# Patient Record
Sex: MALE | Race: WHITE | Employment: UNEMPLOYED | URBAN - NONMETROPOLITAN AREA
[De-identification: names, ages, dates, MRNs, and addresses within clinical notes are randomized per-mention and may not be internally consistent; named-entity substitution may affect disease eponyms.]

---

## 2021-02-05 ENCOUNTER — HOSPITAL ENCOUNTER (INPATIENT)
Age: 21
LOS: 16 days | Discharge: HOME | DRG: 885 | End: 2021-02-23
Attending: EMERGENCY MEDICINE | Admitting: PSYCHIATRY & NEUROLOGY
Payer: COMMERCIAL

## 2021-02-05 DIAGNOSIS — F20.9 SCHIZOPHRENIA, UNSPECIFIED TYPE (HCC): Primary | ICD-10-CM

## 2021-02-05 PROCEDURE — 99285 EMERGENCY DEPT VISIT HI MDM: CPT

## 2021-02-05 PROCEDURE — 80053 COMPREHEN METABOLIC PANEL: CPT

## 2021-02-05 PROCEDURE — U0003 INFECTIOUS AGENT DETECTION BY NUCLEIC ACID (DNA OR RNA); SEVERE ACUTE RESPIRATORY SYNDROME CORONAVIRUS 2 (SARS-COV-2) (CORONAVIRUS DISEASE [COVID-19]), AMPLIFIED PROBE TECHNIQUE, MAKING USE OF HIGH THROUGHPUT TECHNOLOGIES AS DESCRIBED BY CMS-2020-01-R: HCPCS

## 2021-02-05 PROCEDURE — 81001 URINALYSIS AUTO W/SCOPE: CPT

## 2021-02-05 PROCEDURE — 85025 COMPLETE CBC W/AUTO DIFF WBC: CPT

## 2021-02-05 PROCEDURE — 80307 DRUG TEST PRSMV CHEM ANLYZR: CPT

## 2021-02-05 PROCEDURE — 36415 COLL VENOUS BLD VENIPUNCTURE: CPT

## 2021-02-05 PROCEDURE — 82077 ASSAY SPEC XCP UR&BREATH IA: CPT

## 2021-02-05 RX ORDER — SODIUM CHLORIDE 0.9 % (FLUSH) 0.9 %
5-40 SYRINGE (ML) INJECTION AS NEEDED
Status: DISCONTINUED | OUTPATIENT
Start: 2021-02-05 | End: 2021-02-09

## 2021-02-06 LAB
ALBUMIN SERPL-MCNC: 4.1 G/DL (ref 3.5–5)
ALBUMIN/GLOB SERPL: 1.3 {RATIO} (ref 1.1–2.2)
ALP SERPL-CCNC: 51 U/L (ref 45–117)
ALT SERPL-CCNC: 53 U/L (ref 12–78)
AMPHET UR QL SCN: NEGATIVE
ANION GAP SERPL CALC-SCNC: 10 MMOL/L (ref 5–15)
APPEARANCE UR: CLEAR
AST SERPL W P-5'-P-CCNC: 65 U/L (ref 15–37)
BACTERIA URNS QL MICRO: NEGATIVE /HPF
BARBITURATES UR QL SCN: NEGATIVE
BASOPHILS # BLD: 0 K/UL (ref 0–0.2)
BASOPHILS NFR BLD: 0 % (ref 0–2.5)
BENZODIAZ UR QL: NEGATIVE
BILIRUB SERPL-MCNC: 0.6 MG/DL (ref 0.2–1)
BILIRUB UR QL: NEGATIVE
BUN SERPL-MCNC: 14 MG/DL (ref 6–20)
BUN/CREAT SERPL: 13 (ref 12–20)
CA-I BLD-MCNC: 9 MG/DL (ref 8.5–10.1)
CANNABINOIDS UR QL SCN: POSITIVE
CHLORIDE SERPL-SCNC: 102 MMOL/L (ref 97–108)
CO2 SERPL-SCNC: 27 MMOL/L (ref 21–32)
COCAINE UR QL SCN: NEGATIVE
COLOR UR: ABNORMAL
CREAT SERPL-MCNC: 1.06 MG/DL (ref 0.7–1.3)
DRUG SCRN COMMENT,DRGCM: ABNORMAL
ECSTASY, ECST: NEGATIVE
EOSINOPHIL # BLD: 0 K/UL (ref 0–0.7)
EOSINOPHIL NFR BLD: 0 % (ref 0.9–2.9)
ERYTHROCYTE [DISTWIDTH] IN BLOOD BY AUTOMATED COUNT: 12.9 % (ref 11.5–14.5)
ETHANOL SERPL-MCNC: <4 MG/DL
GLOBULIN SER CALC-MCNC: 3.1 G/DL (ref 2–4)
GLUCOSE SERPL-MCNC: 97 MG/DL (ref 65–100)
GLUCOSE UR STRIP.AUTO-MCNC: NEGATIVE MG/DL
HCT VFR BLD AUTO: 42.5 % (ref 41–53)
HGB BLD-MCNC: 14 G/DL (ref 13.5–17.5)
HGB UR QL STRIP: NEGATIVE
KETONES UR QL STRIP.AUTO: NEGATIVE MG/DL
LEUKOCYTE ESTERASE UR QL STRIP.AUTO: NEGATIVE
LYMPHOCYTES # BLD: 1.2 K/UL (ref 1–4.8)
LYMPHOCYTES NFR BLD: 15 % (ref 20.5–51.1)
MCH RBC QN AUTO: 30.4 PG (ref 31–34)
MCHC RBC AUTO-ENTMCNC: 32.9 G/DL (ref 31–36)
MCV RBC AUTO: 92.5 FL (ref 80–100)
METHADONE UR QL: NEGATIVE
MONOCYTES # BLD: 1 K/UL (ref 0.2–2.4)
MONOCYTES NFR BLD: 12 % (ref 1.7–9.3)
NEUTS SEG # BLD: 5.8 K/UL (ref 1.8–7.7)
NEUTS SEG NFR BLD: 73 % (ref 42–75)
NITRITE UR QL STRIP.AUTO: NEGATIVE
NRBC # BLD: 0.01 K/UL
NRBC BLD-RTO: 0.1 PER 100 WBC
OPIATES UR QL: NEGATIVE
PCP UR QL: NEGATIVE
PH UR STRIP: 6.5 [PH] (ref 5–8)
PLATELET # BLD AUTO: 196 K/UL
PMV BLD AUTO: 8.7 FL (ref 6.5–11.5)
POTASSIUM SERPL-SCNC: 3.7 MMOL/L (ref 3.5–5.1)
PROT SERPL-MCNC: 7.2 G/DL (ref 6.4–8.2)
PROT UR STRIP-MCNC: ABNORMAL MG/DL
RBC # BLD AUTO: 4.6 M/UL (ref 4.5–5.9)
RBC #/AREA URNS HPF: ABNORMAL /HPF (ref 0–3)
SARS-COV-2, COV2: NORMAL
SODIUM SERPL-SCNC: 139 MMOL/L (ref 136–145)
SP GR UR REFRACTOMETRY: 1.01 (ref 1–1.03)
UA: UC IF INDICATED,UAUC: ABNORMAL
UROBILINOGEN UR QL STRIP.AUTO: >8 EU/DL (ref 0.2–1)
WBC # BLD AUTO: 8 K/UL (ref 4.4–11.3)
WBC URNS QL MICRO: ABNORMAL /HPF (ref 0–5)

## 2021-02-06 PROCEDURE — 74011250637 HC RX REV CODE- 250/637: Performed by: EMERGENCY MEDICINE

## 2021-02-06 RX ORDER — LORAZEPAM 2 MG/1
2 TABLET ORAL
Status: COMPLETED | OUTPATIENT
Start: 2021-02-06 | End: 2021-02-06

## 2021-02-06 RX ADMIN — LORAZEPAM 2 MG: 2 TABLET ORAL at 19:46

## 2021-02-06 NOTE — ED NOTES
The patient continues to attempt to roam the perales. He is encouraged to stay in the assigned room.

## 2021-02-06 NOTE — ED NOTES
The patient is undressed and is assisted into a gown. The patient is having delusions. He believes that he is [employer] and continues to have auditory hallucinations as evidenced by him holding conversations with individuals that are not present. The patient is calm at this time. He is denying any SI but is unsure of HI. He is unable to state the last time that he has eaten, consumed fluids, or if he is prescribed any medications. He is provided with a warm blanket, ice water and a meal tray.

## 2021-02-06 NOTE — ED NOTES
Per [place] police they are to busy and out on calls to do Eco at this time. Per [place] police call security if pt attempts to run.

## 2021-02-06 NOTE — ED NOTES
Staff Contacted by [facility] Tarsha. We are to fax documentation and she will contact us via telemedicine to conduct a prescreen interview.

## 2021-02-06 NOTE — ED NOTES
Patient care and report given to [facility], RN. The patient is lying on the left side with his eyes closed. He appears to be resting.

## 2021-02-06 NOTE — ED NOTES
[place] [id number] contacted staff and spoke with Dr. Raiza Orantes.  We are to contact [employer] when the patient wakes up and she will pre screen the patient at that time, as per [employer]. Dr. Raiza Orantes was advised to contact the  [facility] if the patient attempts to leave to file for an ECO.

## 2021-02-06 NOTE — ED NOTES
Spoke with Tarsha ALSTON, will fax a ECO form for writer to fill out and fax to [facility] office at [phone]

## 2021-02-06 NOTE — ED NOTES
Pt escorted back to room 3 by 3 officers from [employer]. Per officers they are unable do a ECO and that writer will have to contact [employer] police.
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
## 2021-02-06 NOTE — ED NOTES
The patient is talking to staff with a sitter 1:1. The patient is roaming out into the halls and is pacing in the room talking to himself. He is encouraged to stay in his room.
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
## 2021-02-06 NOTE — ED PROVIDER NOTES
EMERGENCY DEPARTMENT HISTORY AND PHYSICAL EXAM  ?    Date: 2/5/2021  Patient Name: Berta LiuLeila    History of Presenting Illness    Patient presents with:  Altered mental status      History Provided By: [place] Enforcement    HPI: Aditya Dawson, 23 y.o. male with unknown past medical history  presents to the ED brought by [facility] [employer], found wandering [place] [address] shirtless. Patient is claiming he is \"Shawn Finn.\"  Patient states he may or may not have done some PCP today. The patient is having delusions and auditory hallucinations, evidenced by his holding conversations with individuals that are not present. The patient is calm at this time. He is denying any SI but is unsure of HI. He is unable to state the last time that he has eaten, consumed fluids, or if he is prescribed any medications. He says his home is [address] [place] [address]. He cannot tell me where he was headed when he was found by police. He is disoriented to current location. There are no other complaints, changes, or physical findings at this time. PCP: None    Current Facility-Administered Medications:    sodium chloride (NS) flush 5-40 mL, 5-40 mL, IntraVENous, PRN, Lili Mcclain MD        Past History    Past Medical History:  History reviewed. No pertinent past medical history. Past Surgical History:  History reviewed. No pertinent surgical history. Family History:  History reviewed. No pertinent family history. Social History:  Social History   Tobacco Use     Smoking status: Current Every Day Smoker       Packs/day: 1.00     Smokeless tobacco: Never Used   Alcohol use: Not Currently   Drug use: Yes      Allergies:  No Known Allergies      Review of Systems  @[facility]@    Physical Exam  @[facility]    Diagnostic Study Results    Labs -  No results found for this or any previous visit (from the past 12 hour(s)).     Radiologic Studies -   No orders to display  CT Results  (Last 48 hours)   None     CXR Results  (Last 48 hours)   None       Medical Decision Making and ED Course  I am the first provider for this patient. I reviewed the vital signs, available nursing notes, past medical history, past surgical history, family history and social history. Vital Signs-Reviewed the patient's vital signs. Empty flowsheet group. Records Reviewed: Nursing Notes    Provider Notes (Medical Decision Making):   Patient presents with delusions and hallucinations. Unknown if this is new or recurrent problem. Will provide medical screening. The patient presents with differential diagnosis of acute psychosis, drug use, delirium. ED Course:     Initial assessment performed. The patients presenting problems have been discussed, and they are in agreement with the care plan formulated and outlined with them. I have encouraged them to ask questions as they arise throughout their visit. CRITICAL CARE NOTE :  8:59 PM  Amount of Critical Care Time: ___30_(minutes)__    IMPENDING DETERIORATION -CNS  ASSOCIATED RISK FACTORS - CNS Decompensation  MANAGEMENT- Bedside Assessment and Supervision of Care  INTERVENTIONS -psychiatric  CASE REVIEW -[place] [id number]  TREATMENT RESPONSE -Unchanged   PERFORMED BY - Physician    NOTES   :  I have spent critical care time involved in lab review, consultations with specialist, family decision- making, bedside attention and documentation. This time excludes time spent in any separate billed procedures. During this entire length of time I was immediately available to the patient . Belem Greer MD        Disposition    ECO, transfer pending        Diagnosis    Clinical Impression: Schizophrenia    Belem Greer MD    Please note that this dictation was completed with [employer], the computer voice recognition software. Quite often unanticipated grammatical, syntax, homophones, and other interpretive errors are inadvertently transcribed by the computer software.   Please disregard these errors. Please excuse any errors that have escaped final proofreading. Thank you.    ? History reviewed. No pertinent past medical history. History reviewed. No pertinent surgical history. History reviewed. No pertinent family history. Social History     Socioeconomic History    Marital status: UNKNOWN     Spouse name: Not on file    Number of children: Not on file    Years of education: Not on file    Highest education level: Not on file   Occupational History    Not on file   Social Needs    Financial resource strain: Not on file    Food insecurity     Worry: Not on file     Inability: Not on file    Transportation needs     Medical: Not on file     Non-medical: Not on file   Tobacco Use    Smoking status: Current Every Day Smoker     Packs/day: 1.00    Smokeless tobacco: Never Used   Substance and Sexual Activity    Alcohol use: Not Currently    Drug use: Yes    Sexual activity: Yes     Partners: Female     Birth control/protection: None   Lifestyle    Physical activity     Days per week: Not on file     Minutes per session: Not on file    Stress: Not on file   Relationships    Social connections     Talks on phone: Not on file     Gets together: Not on file     Attends Zoroastrianism service: Not on file     Active member of club or organization: Not on file     Attends meetings of clubs or organizations: Not on file     Relationship status: Not on file    Intimate partner violence     Fear of current or ex partner: Not on file     Emotionally abused: Not on file     Physically abused: Not on file     Forced sexual activity: Not on file   Other Topics Concern    Not on file   Social History Narrative    Not on file         ALLERGIES: Patient has no known allergies. Review of Systems   Constitutional: Negative. HENT: Negative. Eyes: Negative. Respiratory: Negative. Cardiovascular: Negative. Gastrointestinal: Negative. Endocrine: Negative. Genitourinary: Negative. Musculoskeletal: Negative. Skin: Negative. Allergic/Immunologic: Negative. Neurological: Negative. Hematological: Negative. Psychiatric/Behavioral: Positive for confusion and hallucinations. Negative for suicidal ideas. The patient is nervous/anxious. There were no vitals filed for this visit. Physical Exam  Vitals signs and nursing note reviewed. Constitutional:       Appearance: Normal appearance. He is well-developed and normal weight. HENT:      Head: Normocephalic and atraumatic. Right Ear: Tympanic membrane, ear canal and external ear normal.      Left Ear: Tympanic membrane, ear canal and external ear normal.      Nose: Nose normal.      Mouth/Throat:      Mouth: Mucous membranes are moist.      Pharynx: Oropharynx is clear. Eyes:      Extraocular Movements: Extraocular movements intact. Conjunctiva/sclera: Conjunctivae normal.      Pupils: Pupils are equal, round, and reactive to light. Neck:      Musculoskeletal: Normal range of motion and neck supple. Cardiovascular:      Rate and Rhythm: Normal rate and regular rhythm. Pulses: Normal pulses. Heart sounds: Normal heart sounds. Pulmonary:      Effort: Pulmonary effort is normal.      Breath sounds: Normal breath sounds. Abdominal:      General: Abdomen is flat. Bowel sounds are normal.      Palpations: Abdomen is soft. Musculoskeletal: Normal range of motion. Skin:     General: Skin is warm and dry. Neurological:      General: No focal deficit present. Mental Status: He is alert. He is disoriented and confused. Psychiatric:         Behavior: Behavior is agitated. Cognition and Memory: Memory is impaired.           MDM  Number of Diagnoses or Management Options     Amount and/or Complexity of Data Reviewed  Clinical lab tests: reviewed    Risk of Complications, Morbidity, and/or Mortality  Presenting problems: high  Diagnostic procedures: moderate  Management options: moderate      ED Course as of Feb 07 0145   Sat Feb 06, 2021   8842 I discussed case with [place] [id number]. They will assess this morning. Patient is medically cleared. [RA]   2015 Patient care signed out at shift change. Patient is under ECO. He eloped twice today. Awaiting Covid tests result for placement.     [RA]      ED Course User Index  [RA] Vianney Simmons MD       Procedures

## 2021-02-06 NOTE — ED NOTES
Prescreen completed. Bob Willingham pt does not have the capacity to make decisions. Bob Willingham Pt needs Eco. Fax info for medical clearance to [phone] once covid results. Attempted to contact [facility] office twice with no answer.  Left  with call back number

## 2021-02-06 NOTE — ED NOTES
The patient has remembered his father's phone number. Same has been added into his demographics. He has given staff verbal permission to contact his father and discuss his medical situation. His father's name is Richelle Rincon and he can be reached at [phone].

## 2021-02-06 NOTE — ED NOTES
T/C to [employer] and was transferred to [employer] who states he was unable to help due to only officers being able to a paperless ECO and was given a phone number for [facility]. Contacted [facility] x3 times and left VMs.

## 2021-02-06 NOTE — ED TRIAGE NOTES
Per EMS, they were called by Rafael Currie for patient who was found wandering [place] [address] [place]. Patient presents on stretcher claiming he is \"Shawn Briseno.\"  Patient states he may or may not have done some PCP today.

## 2021-02-06 NOTE — ED NOTES
Jessica Coto, staff , is assigned as a 1:1 sitter with the patient due to delusions and auditory hallucinations.
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
## 2021-02-06 NOTE — ED NOTES
Call to [employer] to f/u on no reply from  office Per Chencho Greene to attempt [facility] [phone].  Called with no reply left VM

## 2021-02-06 NOTE — ED NOTES
Father contacted, per pt's request, received callback and was given the pt's Mother's phone number for more specific information regarding healthcare: Chris Olguin [phone].

## 2021-02-06 NOTE — ED NOTES
The patient is easily awakened to obtain a COVID 19 test and assess the vital signs. He is calm at this time.

## 2021-02-06 NOTE — ED NOTES
Call to lab to verify if covid test has been sent out. Miriam huynh she just came in and does not know if it has because she was not there.

## 2021-02-06 NOTE — ED NOTES
The patient is now lying on his bed with his eyes closed and he appears to be resting. He is wrapped in a warm blanket and is lying on his left side with his knees drawn up.   NAD noted

## 2021-02-06 NOTE — ED NOTES
The patient's father did not answer the phone. A voice message was left asking him to contact staff.
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
## 2021-02-06 NOTE — ED NOTES
Unable to get skype to connect to email given by Tarsha at [employer].  Attempted to contact [employer] again with no response

## 2021-02-06 NOTE — ED NOTES
The patient is lying supine on the bed with his eyes closed and he appears to be resting. He is moving his foot in his sleep.

## 2021-02-06 NOTE — ED NOTES
Call to lab to see when test will be picked up.  Miriam states pts test is not in their inventory but the test was logged at [facility]

## 2021-02-07 PROBLEM — F20.9 SCHIZOPHRENIA (HCC): Status: ACTIVE | Noted: 2021-02-07

## 2021-02-07 PROBLEM — F31.9 BIPOLAR 1 DISORDER (HCC): Status: ACTIVE | Noted: 2021-02-07

## 2021-02-07 LAB
SARS-COV-2, COV2: NOT DETECTED
SPECIMEN SOURCE: NORMAL

## 2021-02-07 PROCEDURE — 74011250637 HC RX REV CODE- 250/637: Performed by: EMERGENCY MEDICINE

## 2021-02-07 PROCEDURE — 74011250637 HC RX REV CODE- 250/637: Performed by: PSYCHIATRY & NEUROLOGY

## 2021-02-07 PROCEDURE — 74011000250 HC RX REV CODE- 250: Performed by: EMERGENCY MEDICINE

## 2021-02-07 PROCEDURE — 74011000250 HC RX REV CODE- 250

## 2021-02-07 PROCEDURE — 65220000003 HC RM SEMIPRIVATE PSYCH

## 2021-02-07 RX ORDER — BACITRACIN 500 UNIT/G
1 PACKET (EA) TOPICAL
Status: COMPLETED | OUTPATIENT
Start: 2021-02-07 | End: 2021-02-07

## 2021-02-07 RX ORDER — OLANZAPINE 2.5 MG/1
5 TABLET ORAL
Status: DISCONTINUED | OUTPATIENT
Start: 2021-02-07 | End: 2021-02-09

## 2021-02-07 RX ORDER — HALOPERIDOL 5 MG/ML
5 INJECTION INTRAMUSCULAR
Status: DISCONTINUED | OUTPATIENT
Start: 2021-02-07 | End: 2021-02-09

## 2021-02-07 RX ORDER — BENZTROPINE MESYLATE 1 MG/1
1 TABLET ORAL
Status: DISCONTINUED | OUTPATIENT
Start: 2021-02-07 | End: 2021-02-09

## 2021-02-07 RX ORDER — LORAZEPAM 1 MG/1
1 TABLET ORAL
Status: COMPLETED | OUTPATIENT
Start: 2021-02-07 | End: 2021-02-07

## 2021-02-07 RX ORDER — OLANZAPINE 15 MG/1
15 TABLET, ORALLY DISINTEGRATING ORAL 2 TIMES DAILY
Status: DISCONTINUED | OUTPATIENT
Start: 2021-02-07 | End: 2021-02-23 | Stop reason: HOSPADM

## 2021-02-07 RX ORDER — DIPHENHYDRAMINE HYDROCHLORIDE 50 MG/ML
50 INJECTION, SOLUTION INTRAMUSCULAR; INTRAVENOUS
Status: DISCONTINUED | OUTPATIENT
Start: 2021-02-07 | End: 2021-02-09

## 2021-02-07 RX ORDER — ACETAMINOPHEN 325 MG/1
650 TABLET ORAL
Status: DISCONTINUED | OUTPATIENT
Start: 2021-02-07 | End: 2021-02-23 | Stop reason: HOSPADM

## 2021-02-07 RX ORDER — BACITRACIN 500 UNIT/G
PACKET (EA) TOPICAL
Status: COMPLETED
Start: 2021-02-07 | End: 2021-02-07

## 2021-02-07 RX ORDER — TRAZODONE HYDROCHLORIDE 50 MG/1
50 TABLET ORAL
Status: DISCONTINUED | OUTPATIENT
Start: 2021-02-07 | End: 2021-02-23 | Stop reason: HOSPADM

## 2021-02-07 RX ORDER — LORAZEPAM 2 MG/ML
1 INJECTION INTRAMUSCULAR
Status: DISCONTINUED | OUTPATIENT
Start: 2021-02-07 | End: 2021-02-09

## 2021-02-07 RX ORDER — IBUPROFEN 200 MG
1 TABLET ORAL DAILY
Status: DISCONTINUED | OUTPATIENT
Start: 2021-02-08 | End: 2021-02-23 | Stop reason: HOSPADM

## 2021-02-07 RX ORDER — HYDROXYZINE 25 MG/1
50 TABLET, FILM COATED ORAL
Status: DISCONTINUED | OUTPATIENT
Start: 2021-02-07 | End: 2021-02-23 | Stop reason: HOSPADM

## 2021-02-07 RX ADMIN — OLANZAPINE 15 MG: 15 TABLET, ORALLY DISINTEGRATING ORAL at 16:16

## 2021-02-07 RX ADMIN — OLANZAPINE 15 MG: 15 TABLET, ORALLY DISINTEGRATING ORAL at 20:38

## 2021-02-07 RX ADMIN — BACITRACIN 1 PACKET: 500 OINTMENT TOPICAL at 16:17

## 2021-02-07 RX ADMIN — BACITRACIN 1 PACKET: 500 OINTMENT TOPICAL at 08:34

## 2021-02-07 RX ADMIN — LORAZEPAM 1 MG: 1 TABLET ORAL at 11:38

## 2021-02-07 RX ADMIN — OLANZAPINE 5 MG: 2.5 TABLET, FILM COATED ORAL at 13:01

## 2021-02-07 RX ADMIN — TRAZODONE HYDROCHLORIDE 50 MG: 50 TABLET ORAL at 20:38

## 2021-02-07 RX ADMIN — HYDROXYZINE HYDROCHLORIDE 50 MG: 25 TABLET, FILM COATED ORAL at 22:56

## 2021-02-07 NOTE — ED NOTES
Pt up pacing in room. Dressing to left AC placed and bacitracin applied as ordered. [place] Officer at bedside.

## 2021-02-07 NOTE — ED NOTES
U called for update and they are awaiting a patient from outside facility in about 15 minutes. Nurse to call back.

## 2021-02-07 NOTE — ED NOTES
Pt standing outside of room, requesting ativan to sleep. MD aware and stated that he put an order in for it earlier. Pt medicated per MAR, tolerated well.
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
## 2021-02-07 NOTE — ED NOTES
Spoke to Gris Soto in [facility] and pt can be brought back to [address] [place] [address].  Security ntfd

## 2021-02-08 LAB
CHOLEST SERPL-MCNC: 87 MG/DL
EST. AVERAGE GLUCOSE BLD GHB EST-MCNC: 103 MG/DL
HBA1C MFR BLD: 5.2 % (ref 4–5.6)
HDLC SERPL-MCNC: 39 MG/DL
HDLC SERPL: 2.2 {RATIO} (ref 0–5)
LDLC SERPL CALC-MCNC: 39.6 MG/DL (ref 0–100)
LIPID PROFILE,FLP: NORMAL
TRIGL SERPL-MCNC: 42 MG/DL (ref ?–150)
TSH SERPL DL<=0.05 MIU/L-ACNC: 1.43 UIU/ML (ref 0.36–3.74)
VLDLC SERPL CALC-MCNC: 8.4 MG/DL

## 2021-02-08 PROCEDURE — 80061 LIPID PANEL: CPT

## 2021-02-08 PROCEDURE — 74011250637 HC RX REV CODE- 250/637: Performed by: PSYCHIATRY & NEUROLOGY

## 2021-02-08 PROCEDURE — 36415 COLL VENOUS BLD VENIPUNCTURE: CPT

## 2021-02-08 PROCEDURE — 84443 ASSAY THYROID STIM HORMONE: CPT

## 2021-02-08 PROCEDURE — 65220000003 HC RM SEMIPRIVATE PSYCH

## 2021-02-08 PROCEDURE — 83036 HEMOGLOBIN GLYCOSYLATED A1C: CPT

## 2021-02-08 RX ORDER — LORAZEPAM 0.5 MG/1
0.5 TABLET ORAL 3 TIMES DAILY
Status: DISCONTINUED | OUTPATIENT
Start: 2021-02-08 | End: 2021-02-15

## 2021-02-08 RX ORDER — OLANZAPINE 10 MG/1
10 TABLET, ORALLY DISINTEGRATING ORAL
Status: COMPLETED | OUTPATIENT
Start: 2021-02-08 | End: 2021-02-08

## 2021-02-08 RX ADMIN — OLANZAPINE 15 MG: 15 TABLET, ORALLY DISINTEGRATING ORAL at 08:31

## 2021-02-08 RX ADMIN — BENZTROPINE MESYLATE 1 MG: 1 TABLET ORAL at 22:27

## 2021-02-08 RX ADMIN — OLANZAPINE 5 MG: 2.5 TABLET, FILM COATED ORAL at 22:27

## 2021-02-08 RX ADMIN — LORAZEPAM 0.5 MG: 0.5 TABLET ORAL at 13:53

## 2021-02-08 RX ADMIN — HYDROXYZINE HYDROCHLORIDE 50 MG: 25 TABLET, FILM COATED ORAL at 17:56

## 2021-02-08 RX ADMIN — HYDROXYZINE HYDROCHLORIDE 50 MG: 25 TABLET, FILM COATED ORAL at 20:53

## 2021-02-08 RX ADMIN — HYDROXYZINE HYDROCHLORIDE 50 MG: 25 TABLET, FILM COATED ORAL at 08:31

## 2021-02-08 RX ADMIN — OLANZAPINE 15 MG: 15 TABLET, ORALLY DISINTEGRATING ORAL at 20:53

## 2021-02-08 RX ADMIN — LORAZEPAM 0.5 MG: 0.5 TABLET ORAL at 20:53

## 2021-02-08 RX ADMIN — OLANZAPINE 10 MG: 10 TABLET, ORALLY DISINTEGRATING ORAL at 13:53

## 2021-02-08 NOTE — PROGRESS NOTES
Problem: Falls - Risk of  Goal: *Absence of Falls  Description: Document [facility] Fall Risk and appropriate interventions in the flowsheet. Outcome: Progressing Towards Goal  Note: Fall Risk Interventions:       Mentation Interventions: Room close to nurse's station    Medication Interventions: Teach patient to arise slowly    Elimination Interventions:  Toilet paper/wipes in reach    History of Falls Interventions: Room close to nurse's station         Problem: Patient Education: Go to Patient Education Activity  Goal: Patient/Family Education  Outcome: Progressing Towards Goal     Problem: Psychosis  Goal: *STG: Decreased hallucinations  Outcome: Progressing Towards Goal  Goal: *STG: Decreased delusional thinking  Outcome: Progressing Towards Goal  Goal: *STG: Remains safe in hospital  Outcome: Progressing Towards Goal  Goal: *STG: Seeks staff when feelings of self harm or harm towards others arise  Outcome: Progressing Towards Goal  Goal: *STG: Patient will verbalize areas in need of boundary recognition and limit setting  Outcome: Progressing Towards Goal  Goal: *STG: Patient will develop strategies to regulate emotions and corresponding behaviors  Outcome: Progressing Towards Goal  Goal: *STG: Accept constructive criticism without injury or isolation  Outcome: Progressing Towards Goal  Goal: *STG: Participates in individual and group therapy  Outcome: Progressing Towards Goal  Goal: *STG: Develop safety plan for times when triggered in stressful situations  Outcome: Progressing Towards Goal  Goal: *STG: Patient will verbalize issues that get in their way of progress (i.e.: anger, fear etc.)  Outcome: Progressing Towards Goal  Goal: *STG/LTG: Complies with medication therapy  Outcome: Progressing Towards Goal  Goal: *STG/LTG: Demonstrates improved thought patterns as evidenced by logical and coherent speech  Outcome: Progressing Towards Goal  Goal: *STG/LTG: Demonstrates improved social functioning by responding appropriately to staff  Outcome: Progressing Towards Goal  Goal: Interventions  Outcome: Progressing Towards Goal     Problem: *Psychosis: Discharge Outcome  Goal: *Absent or Controlled Active Psychotic Symptoms  Outcome: Progressing Towards Goal     Problem: Patient Education: Go to Patient Education Activity  Goal: Patient/Family Education  Outcome: Progressing Towards Goal

## 2021-02-08 NOTE — PROGRESS NOTES
Problem: Falls - Risk of  Goal: *Absence of Falls  Description: Document [place] [facility] Fall Risk and appropriate interventions in the flowsheet. Outcome: Progressing Towards Goal  Note: Fall Risk Interventions:       Mentation Interventions: Room close to nurse's station, Door open when patient unattended    Medication Interventions: Teach patient to arise slowly    Elimination Interventions:  Toilet paper/wipes in reach    History of Falls Interventions: Door open when patient unattended, Room close to nurse's station

## 2021-02-08 NOTE — BH NOTES
Pt slept overnight with waking intervals to use bathrooma nd drink water, sleeping at this time, no violent/self harming behaviors noticed or reported, safety checks maintained Q 15 minutes.

## 2021-02-08 NOTE — BH NOTES
Pt recived beging of shift in bed sleeping, got up by 2010 and joined group and ate snack, pt rated depression as 5, anxiety 5, denies SI.HI . Cooperative with assessment, his Left arm  AC area abrasion( not new finding) without dressing, he said he scratch it this is how he got the abrasion, he removed the dya shift dressing, big band aid applied. Pt given at  2038 HS medication and Trazodone 50 mg prn for insomnia, he came to window at 2256 telling unable to sleep, given Atarax 50mg for anxiety. PT sleeping at this time, no violent ,no self harming behavior noticed or reported.

## 2021-02-08 NOTE — BH NOTES
camila sent to [facility] given to security [place]( total fierro amount of $ 102) + wallet with 6 cards, and one ID card.
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
## 2021-02-08 NOTE — BH NOTES
Pt has been visible through out the day. Pt is attending groups, and is not interacting appropriately with staff and peers. Pt has been observed walking the hallway at different intervals throughout the day, stopping and staring into the nurses station, however doesn't appear to be focusing on anyone or thing in particular. Pt has spoken with both his mother and father separately today, and appeared to be upset, saddened by the phone calls. no s/s of distress noted. no complaints of pain. Pt mood has been calm, stable. Continue safety checks.

## 2021-02-08 NOTE — GROUP NOTE
IP  GROUP DOCUMENTATION INDIVIDUAL Group Therapy Note Date: 2/8/2021 Group Start Time: 0830 Group End Time: 0900 Group Topic: Process Group - [facility]  [facility] 
 
IP  GROUP DOCUMENTATION GROUP Group Therapy Note SW led process group for daily check-in. Group members were asked their current feelings, triggers, and goals. Group members were encouraged to share openly. Attendance: Attended Patient's Goal:  Attendance Interventions/techniques: Informed, Validated and Supported Follows Directions: Followed directions Interactions: Interacted appropriately Mental Status: Delusions and Flat Behavior/appearance: Attentive, Cooperative and Disheveled Goals Achieved: Able to listen to others Additional Notes:  Sandra Boggs was minimally verbal this morning. He reports he is unclear about why he is here. He denies depression and anxiety at this time. TAYLOR Gibson, Supervisee in Social Work
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
## 2021-02-09 PROCEDURE — 74011000250 HC RX REV CODE- 250: Performed by: NURSE PRACTITIONER

## 2021-02-09 PROCEDURE — 65220000003 HC RM SEMIPRIVATE PSYCH

## 2021-02-09 PROCEDURE — 74011250637 HC RX REV CODE- 250/637: Performed by: PSYCHIATRY & NEUROLOGY

## 2021-02-09 RX ORDER — HALOPERIDOL 5 MG/1
5 TABLET ORAL 2 TIMES DAILY
Status: DISCONTINUED | OUTPATIENT
Start: 2021-02-09 | End: 2021-02-23 | Stop reason: HOSPADM

## 2021-02-09 RX ADMIN — TRAZODONE HYDROCHLORIDE 50 MG: 50 TABLET ORAL at 22:10

## 2021-02-09 RX ADMIN — LORAZEPAM 0.5 MG: 0.5 TABLET ORAL at 22:09

## 2021-02-09 RX ADMIN — LORAZEPAM 0.5 MG: 0.5 TABLET ORAL at 08:18

## 2021-02-09 RX ADMIN — BACITRACIN ZINC, POLYMYXIN B SULFATE, NEOMYCIN SULFATE 1 PACKET: 400; 5000; 3.5 OINTMENT TOPICAL at 17:02

## 2021-02-09 RX ADMIN — BACITRACIN ZINC, POLYMYXIN B SULFATE, NEOMYCIN SULFATE 1 PACKET: 400; 5000; 3.5 OINTMENT TOPICAL at 22:10

## 2021-02-09 RX ADMIN — OLANZAPINE 15 MG: 15 TABLET, ORALLY DISINTEGRATING ORAL at 08:18

## 2021-02-09 RX ADMIN — OLANZAPINE 15 MG: 15 TABLET, ORALLY DISINTEGRATING ORAL at 22:09

## 2021-02-09 RX ADMIN — HALOPERIDOL 5 MG: 5 TABLET ORAL at 22:09

## 2021-02-09 RX ADMIN — LORAZEPAM 0.5 MG: 0.5 TABLET ORAL at 13:04

## 2021-02-09 RX ADMIN — HALOPERIDOL 5 MG: 5 TABLET ORAL at 11:57

## 2021-02-09 RX ADMIN — BACITRACIN ZINC, POLYMYXIN B SULFATE, NEOMYCIN SULFATE 1 PACKET: 400; 5000; 3.5 OINTMENT TOPICAL at 08:18

## 2021-02-09 RX ADMIN — HYDROXYZINE HYDROCHLORIDE 50 MG: 25 TABLET, FILM COATED ORAL at 22:09
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
## 2021-02-09 NOTE — BH NOTES
Pt. Alert and oriented x 2. Pt. States depression is a 5. Pt. States anxiety is 2. Pt. Denies hallucinations, however observed responding to internal stimuli. Denies SI/HI. Cooperative with assessment. Pt. Walking back forth in front of nurses station window, staring in  Each time, stating he does not need anything, redirected patient to group room. Pt. Speaking about \"equations\" things just not right. Repeatedly asking for medications, right after taking them. I: Administer medications as ordered and needed, Encourage pt to attend and participate in groups, encourage pt to be up for all meals and snacks, consuming all each time, encourage pt to interact with peers in a positive manner. Q 15 minute safety checks continue. R: Compliant with medications. does not attend groups, does not participate. Pt. is getting up for meals, consumes all of meals, snacks. Pt. does not interact with peers. no safety concerns at this time. P: Pt. Will develop positive coping skills and utilize them, decrease number of delusional episodes.

## 2021-02-09 NOTE — GROUP NOTE
[facility] GROUP DOCUMENTATION INDIVIDUAL Group Therapy Note Date: 2/9/2021 Group Start Time: 1400 Group End Time: 1500 Group Topic: Education Group - Inpatient [facility] Arlene CAN 
 
[facility] GROUP DOCUMENTATION GROUP Group Therapy Note FLY facilitated group and provided psychoeducation to group members. Topic was grounding skills. Experiences of emotionality, detachment, and dissociation were discussed. Grounding was defined and examples were given. Group members identified experiences of detachment, triggers, and things that they find grounding. Grounding techniques were discussed and handout was given. Demonstrated 5-4-3-2-1 technique and deep breathing. Attendance: Attended Patient's Goal:  Attendance Interventions/techniques: Informed, Validated and Supported Follows Directions: Followed directions Interactions: Interacted appropriately Mental Status: Calm and Delusions Behavior/appearance: Attentive and Cooperative Goals Achieved: Able to engage in interactions, Able to listen to others, Able to reflect/comment on own behavior, Able to receive feedback, Able to self-disclose, Identified feelings and Identified triggers Additional Notes:  Pau Victor participated in group process. At times he was able to stay on topic and seemed rational.   
 
Margareth Ahmadi MSW, Supervisee in Social Work

## 2021-02-09 NOTE — GROUP NOTE
[facility] GROUP DOCUMENTATION INDIVIDUAL Group Therapy Note Date: 2/8/2021 Group Start Time: 1400 Group End Time: 1430 Group Topic: Education Group - Inpatient [facility]IORAL [facility] Francie [place] PAMELLA 
 
[facility] GROUP DOCUMENTATION GROUP Group Therapy Note Group members received psychoeducation on relapse prevention. Past relapse were discussed as well as triggers and support systems. Group members were given the opportunity to see how they can take different steps to keep from rehospitalization. Attendance: Attended Patient's Goal:  Attendance Interventions/techniques: Informed, Validated and Supported Follows Directions: Followed directions Interactions: Interacted appropriately Mental Status: Calm Behavior/appearance: Cooperative Goals Achieved: Able to listen to others, Able to reflect/comment on own behavior and Identified feelings Additional Notes:  Felipe Graham presents as confused, but does interact. He was unable to recall any past relapses in depth. He reports that sometimes women led him to relapse. Creta Canavan, MSW, Supervisee in Social Work

## 2021-02-09 NOTE — BH NOTES
B:Patient is alert and oriented x 3. Patient has been pacing most of the first part of the evening. Patient is not able to stay still for any period of time. Patient continues to ask for medications to help him sleep. Patient walks around like he is on a daze. Patient requires redirection. Patient has shown no episodes of aggressive behaviors noted. Will continue to monitor patient every 15 minutes for safety. I: Provide patient with medications as ordered. Continue to monitor every 15 minutes for safety. R: Patient has been complaint with his medications. Patient finally after 2 ours of pacing has settled in other bed in his room with eyes closed. Patient continues to be monitored evrey 15 minutes foe safety. P: Patient has shown no evidence of self harming behaviors. No coping skills noted thus far.

## 2021-02-09 NOTE — BH NOTES
Pt. Confused some today, observed responding to internal stimuli, delusional statements at times regarding math equations. Paranoid statements, such as, \" I will delete my facebook account now, give me a computer and I will get rid of it\", pt. Reoriented to situation, that he cannot have electronics here. Pt. Came up to nurses station window stating, \" I know what is going on around here, you guys saying I don't know and about court is not right, I do know\", as patient overheard phone conversation at nurses station regarding another patient. Pt. Reoriented several times throughout the day. Pacing in front of nurses station most of morning, up and down hallway, then to room, then back to day room. Soft spoken, walks with head down at times. No safety concerns noted. q 15 minute safety checks continue.

## 2021-02-09 NOTE — BH NOTES
Behavioral Health Treatment Team Note     Patient goal(s) for today: Take my medicine  Treatment team focus/goals: Engage in treatment, contact family, monitor medication    Progress note: Upon interview Liz Cedeno is AO x 3, denies HI/SI, with evidence of delusions. He rates both anxiety and depression as 0 of 10. Liz Cedeno is more verbal, slow rate of speech, illogical in thinking, and poor historian. Concern about his ability to care for himself is still present. LOS:  2  Expected LOS: 14    Insurance info/prescription coverage:  TDO, will check if he has Medicaid. Date of last family contact:  2/9/2021  Family requesting physician contact today:  no  Discharge plan:  Discharge to family or possible crisis stabilization.   Guns in the home:  no   Outpatient provider(s):  None at this time    Participating treatment team members: Dirk Parish, Supervisee in Social Work, [employer] LPN, Danita Cason MD

## 2021-02-10 PROCEDURE — 74011250637 HC RX REV CODE- 250/637: Performed by: PSYCHIATRY & NEUROLOGY

## 2021-02-10 PROCEDURE — 74011000250 HC RX REV CODE- 250

## 2021-02-10 PROCEDURE — 74011000250 HC RX REV CODE- 250: Performed by: PSYCHIATRY & NEUROLOGY

## 2021-02-10 PROCEDURE — 74011000250 HC RX REV CODE- 250: Performed by: NURSE PRACTITIONER

## 2021-02-10 PROCEDURE — 65220000003 HC RM SEMIPRIVATE PSYCH

## 2021-02-10 RX ORDER — BACITRACIN 500 UNIT/G
1 PACKET (EA) TOPICAL
Status: COMPLETED | OUTPATIENT
Start: 2021-02-10 | End: 2021-02-10

## 2021-02-10 RX ORDER — BACITRACIN 500 UNIT/G
PACKET (EA) TOPICAL
Status: COMPLETED
Start: 2021-02-10 | End: 2021-02-10

## 2021-02-10 RX ADMIN — LORAZEPAM 0.5 MG: 0.5 TABLET ORAL at 09:04

## 2021-02-10 RX ADMIN — HYDROXYZINE HYDROCHLORIDE 50 MG: 25 TABLET, FILM COATED ORAL at 12:09

## 2021-02-10 RX ADMIN — HALOPERIDOL 5 MG: 5 TABLET ORAL at 09:05

## 2021-02-10 RX ADMIN — BACITRACIN 1 PACKET: 500 OINTMENT TOPICAL at 18:31

## 2021-02-10 RX ADMIN — OLANZAPINE 15 MG: 15 TABLET, ORALLY DISINTEGRATING ORAL at 09:04

## 2021-02-10 RX ADMIN — BACITRACIN 1 PACKET: 500 OINTMENT TOPICAL at 14:00

## 2021-02-10 RX ADMIN — LORAZEPAM 0.5 MG: 0.5 TABLET ORAL at 22:06

## 2021-02-10 RX ADMIN — TRAZODONE HYDROCHLORIDE 50 MG: 50 TABLET ORAL at 22:06

## 2021-02-10 RX ADMIN — OLANZAPINE 15 MG: 15 TABLET, ORALLY DISINTEGRATING ORAL at 22:06

## 2021-02-10 RX ADMIN — BACITRACIN ZINC, POLYMYXIN B SULFATE, NEOMYCIN SULFATE 1 PACKET: 400; 5000; 3.5 OINTMENT TOPICAL at 09:05

## 2021-02-10 RX ADMIN — LORAZEPAM 0.5 MG: 0.5 TABLET ORAL at 13:29

## 2021-02-10 RX ADMIN — HYDROXYZINE HYDROCHLORIDE 50 MG: 25 TABLET, FILM COATED ORAL at 22:06

## 2021-02-10 RX ADMIN — ACETAMINOPHEN 650 MG: 325 TABLET ORAL at 09:56

## 2021-02-10 RX ADMIN — HALOPERIDOL 5 MG: 5 TABLET ORAL at 22:06
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
## 2021-02-10 NOTE — BH NOTES
Pt. Alert and oriented x 3. Pt. States depression is a 3. Pt. States anxiety is 5. Pt. denies hallucinations. Denies SI/HI. No further complaint with assessment. I: Administer medications as ordered and needed, Encourage pt to attend and participate in groups, encourage pt to be up for all meals and snacks, consuming all each time, encourage pt to interact with peers in a positive manner. Q 15 minute safety checks continue. R: Compliant with medications. does attend groups, does participate. Pt. is getting up for meals, consumes 100% of meals, snacks. Pt. does interact with peers. no safety concerns at this time. P: Patient will continue to comply with treatments including medication and therapies. Patient will continue to notify staff of negative thought processes.

## 2021-02-10 NOTE — GROUP NOTE
[facility] GROUP DOCUMENTATION INDIVIDUAL Group Therapy Note Date: 2/10/2021 Group Start Time: 0830 Group End Time: 0900 Group Topic: Process Group - Inpatient [facility] BE[facility]IORAL HE[facility] Richy CAN 
 
[facility] GROUP DOCUMENTATION GROUP Group Therapy Note 
 
(5 of 6 attended) Group members took part in process group. Current depression and anxiety as well as daily goals were discussed. Group members also noted their feelings about being in the facility and their plans for self-care after getting out. Attendance: Attended Patient's Goal:  Attendance Interventions/techniques: Informed, Validated, Reinforced and Supported Follows Directions: Followed directions Interactions: Interacted appropriately Mental Status: Calm and Restricted Behavior/appearance: Attentive, Cooperative and Poor eye contact Goals Achieved: Able to listen to others, Able to give feedback to another, Able to receive feedback, Able to self-disclose, Discussed coping and Discussed discharge plans Additional Notes:  [facility] is AO x 3, without SI/HI, denies A/V hallucinations today. [facility] is disheveled with a somewhat restricted affect. He continues to endorse belief that his 5year old brother is evil. He expresses anger at his mother and states, \"If I saw her I would beat the mess out of her. \"  He is verbal with slow speech, but can go off-topic and speak randomly when others are speaking during group interactions. Safety concerns are present for his ability to self-care and safety of others especially family at this time. TAYLOR Cannon, Supervisee in Social Work

## 2021-02-10 NOTE — BH NOTES
B: Patient is alert and has been to the desk several times but is easily redirected, Patient up for wrap up session but cannot complete worksheet. Patient ate well for snack. Patient showers daily and is dressed in hospital approved scrubs. Patient has a blank look on his face when you try to engage him in conversation. Patient Is being monitored every 15 minutes for safety. Patient has a difficult time getting to sleep Once he is sleep he stays sleep. Patient shakes his head no when asked about Depression, anxiety, SI/HI ideations and hallucinations. Will continue to monitor patient every 15 minutes for safety. I: Provide medications as ordered. Assess for depression every shift. R: Patient is compliant with all medications. Patient is eating well for snacks on this shift. Acts like he is starved. Ate 2 sandwiches and asked for more. Patient continue to be monitored every 15 minutes for safety. P: Patient has not shown any signs of self harming behaviors. Patient enjoys drawing and coloring.

## 2021-02-10 NOTE — BH NOTES
Pt. C/o rt. Hand pain. Pt. Rated rt. Hand pain 3/10. Tylenol 650mg given p.o. for c/o  Rt. Hand pain.

## 2021-02-11 PROCEDURE — 74011000250 HC RX REV CODE- 250: Performed by: NURSE PRACTITIONER

## 2021-02-11 PROCEDURE — 74011250637 HC RX REV CODE- 250/637: Performed by: PSYCHIATRY & NEUROLOGY

## 2021-02-11 PROCEDURE — 65220000003 HC RM SEMIPRIVATE PSYCH

## 2021-02-11 RX ADMIN — HALOPERIDOL 5 MG: 5 TABLET ORAL at 08:20

## 2021-02-11 RX ADMIN — HALOPERIDOL 5 MG: 5 TABLET ORAL at 21:04

## 2021-02-11 RX ADMIN — OLANZAPINE 15 MG: 15 TABLET, ORALLY DISINTEGRATING ORAL at 08:20

## 2021-02-11 RX ADMIN — TRAZODONE HYDROCHLORIDE 50 MG: 50 TABLET ORAL at 21:04

## 2021-02-11 RX ADMIN — LORAZEPAM 0.5 MG: 0.5 TABLET ORAL at 21:04

## 2021-02-11 RX ADMIN — OLANZAPINE 15 MG: 15 TABLET, ORALLY DISINTEGRATING ORAL at 21:04

## 2021-02-11 RX ADMIN — BACITRACIN ZINC, POLYMYXIN B SULFATE, NEOMYCIN SULFATE 1 PACKET: 400; 5000; 3.5 OINTMENT TOPICAL at 08:20

## 2021-02-11 RX ADMIN — BACITRACIN ZINC, POLYMYXIN B SULFATE, NEOMYCIN SULFATE 1 PACKET: 400; 5000; 3.5 OINTMENT TOPICAL at 17:00

## 2021-02-11 RX ADMIN — LORAZEPAM 0.5 MG: 0.5 TABLET ORAL at 13:04

## 2021-02-11 RX ADMIN — LORAZEPAM 0.5 MG: 0.5 TABLET ORAL at 08:20

## 2021-02-11 NOTE — GROUP NOTE
[facility] GROUP DOCUMENTATION INDIVIDUAL Group Therapy Note Date: 2/11/2021 Group Start Time: 1400 Group End Time: 0534 Group Topic: Education Group - Inpatient [facility]R [facility] BE[facility]AVIORAL HEALTH Radha CAN 
 
[facility] GROUP DOCUMENTATION GROUP Group Therapy Note Group members received psychoeducation on deep breathing using the 4-4-6 technique. The technique was also practiced. Group members discussed behavior changes they needed to make in their lives after reading, Milind Martínez in [employer]. \"  Group members were asked to give commentary and relate to how they had patterns that they could address. Attendance: Attended Patient's Goal:  Attendance Interventions/techniques: Informed, Validated and Supported Follows Directions: Followed directions Interactions: Interacted appropriately Mental Status: Calm and Congruent Behavior/appearance: Attentive and Cooperative Goals Achieved: Able to engage in interactions, Able to listen to others, Able to reflect/comment on own behavior, Able to receive feedback, Able to self-disclose and Identified relapse prevention strategies Additional Notes:  Constantino Brunner reports that he wants to change the pattern of being hospitalized. He reports he somehow ends back up in the hospital.  He admits to getting off of medications in the past and states he will stay on his current medications. TAYLOR Meyer, Supervisee in Social Work

## 2021-02-11 NOTE — GROUP NOTE
[facility] GROUP DOCUMENTATION INDIVIDUAL Group Therapy Note Date: 2/10/2021 Group Start Time: 1400 Group End Time: 1430 Group Topic: Education Group - Inpatient [facility]R 1 BEHAVIORAL HEALTH Arlene CAN 
 
[facility] GROUP DOCUMENTATION GROUP Group Therapy Note SW provided psychoeducation on ABC model of thoughts and emotions. Group members discussed thoughts, emotions, when they felt stuck in a thought. SW provided instruction on thought disputation and examining thoughts as an observer. Attendance: Attended Patient's Goal:  Attendance Interventions/techniques: Informed, Validated and Supported Follows Directions: Followed directions Interactions: Interacted appropriately Mental Status: Calm Behavior/appearance: Attentive and Cooperative Goals Achieved: Able to engage in interactions, Able to listen to others and Discussed coping Additional Notes:  Pau Victor interacted during group. He reports that he does get into negative lines of thinking. He appeared to be listening and was generally cooperative during group. TAYLOR Lopez, Supervisee in Social Work

## 2021-02-11 NOTE — BH NOTES
Pt. Alert and oriented x person and place. Pt. States depression is a 5. Pt. States anxiety is 6. Pt. denies hallucinations. Denies SI/HI. No other complaints with assessment. I: Administer medications as ordered and needed, Encourage pt to attend and participate in groups, encourage pt to be up for all meals and snacks, consuming all each time, encourage pt to interact with peers in a positive manner. Q 15 minute safety checks continue. R: Compliant with medications. does attend groups, does participate. Pt. is getting up for meals, consumes 100% of meals, snacks. Pt. does interact with peers. no safety concerns at this time. P: Patient will notify staff if/when delusions surface and gain intensity or when hallucinations are present.

## 2021-02-11 NOTE — BH NOTES
B: Patient constantly at nurses station. Speak is so soft spoken that it is almost impossible to hear him. Patient is easily redirected. Patient remains delusional continues to talk about his younger brother having seizures and it was hard for him to watch. Patient came up to this writer 3 times talking about this little brother and his seizures. Patient cannnot stay focused and any task for any period of time. Paces a lot in the hallway. Patient constantly asking for medications. Patient has an abrasion on his left antecubal area. Patient constantly taking dressing off and picking at wound. Area had bactroban ointment applied and covered with xl-arge bandaide. Encouraged patient to leave dressing alone before it gets infected. Patient up every night until 1200 to 0100 in the am. Goes to sleep wakes up comes to nurses station talks about his brother and the cycle repeats. Patient finally settles down and goes to sleep. Will continue to monitor patient every 15 minutes for safety. I: Provide medications as ordered. Monitor for delusions. Monitor patient every 15 minutes for safety. R: Patient has been compliant with medications but asks for more and more medications. Patient at present is lying in bed with eyes closed. Patient  Complains of being hungry. Will see if patient can receive double portions of food. Extra sandwich and ice- cream and fruit given to patient who ate as though he was starved. Will have order for dietary consult. Will continue to monitor every 15 minutes for safety. P: Patient continues to pick at abrasion.  Patient writes and colors in his journal.

## 2021-02-11 NOTE — GROUP NOTE
[facility] GROUP DOCUMENTATION INDIVIDUAL Group Therapy Note Date: 2/11/2021 Group Start Time: 0900 Group End Time: 0930 Group Topic: Process Group - Inpatient [facility]R 1 BEHAVIORAL HEALTH Dedra CAN 
 
[facility] GROUP DOCUMENTATION GROUP Group Therapy Note Group members attended morning process group. Group members identified their anxiety, depression, goals, and other feelings. Each member was also asked what they wanted to address/change in the future. Attendance: Attended Patient's Goal:  Attendance Interventions/techniques: Informed, Validated and Supported Follows Directions: Followed directions Interactions: Interacted appropriately Mental Status: Calm, Delusions and Restricted Behavior/appearance: Attentive, Cooperative and Disheveled Goals Achieved: Able to engage in interactions, Able to listen to others, Able to receive feedback and Identified feelings Additional Notes:  Galindo Madrid presents AO x 3, denies SI/HI, denies current A/V hallucinations, cooperative. Slow speech, thinking is still illogical.  Continues to have delusions. He reports his anxiety is currently 1 of 10 and depression is 4 of 10. Team will continue to monitor his medications, engage in treatment, and plan for discharge when appropriate. Safety concerns are still present due to his delusions. TAYLOR El, Supervisee in Social Work

## 2021-02-11 NOTE — PROGRESS NOTES
Discussed case interviewed patient  Continues to have difficulties with paranoid ideations  We have considered adding medication Haldol to his current medications  Anxious difficulty sustaining attention  He is able to talk about some of the difficulties in his life including his brother's illness  Attend some groups  Pledges for safety on the unit  At times seems to be struggling with holding it together  Frequency nursing station  Ongoing delusions  Taking and tolerating medications well  Unstable mood    Mental status exam  Alert oriented struggling to hold emotions and thoughts together  Speech is goal-directed repetitive tangential  Mood is unstable affect is labile  Positive paranoid ideations  Preoccupied  Anxious, short attention span  Insight and judgment fair    Recommendations  Continue inpatient treatments  I will add Haldol 5 mg 2 times a day  Follow-up with me in the team tomorrow

## 2021-02-11 NOTE — BH NOTES
Behavioral Health Treatment Team Note      Patient goal(s) for today: Take my medicine  Treatment team focus/goals: Engage in treatment, contact family, monitor medication     Progress note: Derick presents AO x 3, denies SI/HI, denies current A/V hallucinations, cooperative. Slow speech, thinking is still illogical.  Continues to have delusions. He reports his anxiety is currently 1 of 10 and depression is 4 of 10. Team will continue to monitor his medications, engage in treatment, and plan for discharge when appropriate. Safety concerns are still present due to his delusions.          LOS:  4                       Expected LOS: 14     Insurance info/prescription coverage:  Mother states he has [place] Medicaid  Date of last family contact:  2/9/2021  Family requesting physician contact today:  no  Discharge plan:  Discharge to family or possible crisis stabilization.   Guns in the home:  no   Outpatient provider(s):  None at this time     Participating treatment team members: Derick [address] [facility] MSW, Supervisee in Social Work, Norma Juarez RN, Jessee Ruiz MD

## 2021-02-12 PROCEDURE — 65220000003 HC RM SEMIPRIVATE PSYCH

## 2021-02-12 PROCEDURE — 74011000250 HC RX REV CODE- 250: Performed by: NURSE PRACTITIONER

## 2021-02-12 PROCEDURE — 74011250637 HC RX REV CODE- 250/637: Performed by: PSYCHIATRY & NEUROLOGY

## 2021-02-12 RX ADMIN — LORAZEPAM 0.5 MG: 0.5 TABLET ORAL at 20:28

## 2021-02-12 RX ADMIN — HALOPERIDOL 5 MG: 5 TABLET ORAL at 08:48

## 2021-02-12 RX ADMIN — HYDROXYZINE HYDROCHLORIDE 50 MG: 25 TABLET, FILM COATED ORAL at 17:36

## 2021-02-12 RX ADMIN — OLANZAPINE 15 MG: 15 TABLET, ORALLY DISINTEGRATING ORAL at 20:28

## 2021-02-12 RX ADMIN — HALOPERIDOL 5 MG: 5 TABLET ORAL at 20:28

## 2021-02-12 RX ADMIN — LORAZEPAM 0.5 MG: 0.5 TABLET ORAL at 08:48

## 2021-02-12 RX ADMIN — BACITRACIN ZINC, POLYMYXIN B SULFATE, NEOMYCIN SULFATE 1 PACKET: 400; 5000; 3.5 OINTMENT TOPICAL at 18:07

## 2021-02-12 RX ADMIN — BACITRACIN ZINC, POLYMYXIN B SULFATE, NEOMYCIN SULFATE 1 PACKET: 400; 5000; 3.5 OINTMENT TOPICAL at 08:48

## 2021-02-12 RX ADMIN — OLANZAPINE 15 MG: 15 TABLET, ORALLY DISINTEGRATING ORAL at 08:48

## 2021-02-12 RX ADMIN — LORAZEPAM 0.5 MG: 0.5 TABLET ORAL at 13:03

## 2021-02-12 NOTE — BH NOTES
B: Pt is alert and oriented x3. Pt is cooperative with assessments. Pt reports feeling \"anxious\". Pt states they are here because, unknown, Pt doesn't recall events leading to hospitalization. States he's worried about charges from prior to admission. Pt identifies they're doing better since their admission because \"most definitely better\". .  No s/s of physical distress noted. Mild complaints of pain where pustule on back is, no requests for pain relief measures. I: Assess Pt mood. Document presence of depressive/anxiety symptoms. Assess for Audio/visual hallucinations. Establish trust.  Educate Pt on medications and disease processes. Monitor ADLs, food/fluid consumption and sleep. Encouarge group participation and socialization. Educate Pt on medications, coping alternatives, disease processes, and community resources. Safety checks. R: Pt mood is stable. Pt rates depression 3/10, identifies triggers as \"medications not strong enough to get rid of anxiety\". Pt rates anxiety at 9/10, identifies triggers as \"being released from hospital and having charges pending\". Pt denies SI. Pt denies HI. Pt denies audio/visual hallucinations, s/s are not observed by staff. Pt reports mildly racing thoughts 2/10. Pt is attending groups and is interacting appropriately with staff/peers. Pt is eating all meals, and is maintaining their personal hygiene. Pt reports sleeping well. Pt is compliant wiith medications. P:  Encourage group participation and socialization.

## 2021-02-12 NOTE — BH NOTES
Pt is lying in bed 103-01 (unassigned bed), eyes closed, appears to be resting comfortably. No s/s of distress noted. No complaints of pain at this time. Pt attended evening group and ate snack. Pt is compliant with medications and maintaining his personal hygiene. Continue safety checks.

## 2021-02-12 NOTE — BH NOTES
Pt. Alert and oriented x 3. Physical Assessment was done et wnl. Pt. Calm et cooperative. No c/o pain voiced. Pt. States depression is a 0/10. Pt. States anxiety is 0/10. Pt. Denies having  hallucinations. I: Administer medications as ordered and needed, Encourage pt to attend and participate in groups, encourage pt to be up for all meals and snacks, consuming all each time, encourage pt to interact with peers in a positive manner. Q 15 minute safety checks continue. R: Pt. Is compliant with medications. does attend groups, does participate. Pt. Is  getting up for meals, consumes 100% of meals, snacks. Pt. does interact with peers. no safety concerns at this time. P: Will cont. To monitor q15min. Checks for safety. Administer medications as ordered et as needed. Will cont. To encourage group attendance et participation.

## 2021-02-12 NOTE — PROGRESS NOTES
Discussed case interviewed patient  Continues to have somewhat of an unusual outlook  Positive paranoid ideations  Frequency nurses station  Asks for higher dose of controlled substance medication  Has difficulty thinking clearly confused at times  No overt aggression  Unstable mood, anxious, difficulty having a reasonable conversation  Sleep is okay denies hearing voices  Taking and tolerating medications well    Mental status exam  As above has difficulty functioning  Speech is goal-directed short answers  Affect is restricted  Positive paranoid ideations  Insight and judgment poor to fair    Recommendations  Continue inpatient treatments  Medication regimen reviewed  Follow-up with me in the team tomorrow

## 2021-02-12 NOTE — PROGRESS NOTES
Problem: Falls - Risk of  Goal: *Absence of Falls  Description: Document Chayo Calix Fall Risk and appropriate interventions in the flowsheet. Outcome: Progressing Towards Goal  Note: Fall Risk Interventions:       Mentation Interventions: Door open when patient unattended, Room close to nurse's station    Medication Interventions: Teach patient to arise slowly    Elimination Interventions:  Toilet paper/wipes in reach    History of Falls Interventions: Room close to nurse's station, Door open when patient unattended         Problem: Patient Education: Go to Patient Education Activity  Goal: Patient/Family Education  Outcome: Progressing Towards Goal     Problem: Psychosis  Goal: *STG: Decreased hallucinations  Outcome: Progressing Towards Goal  Goal: *STG: Decreased delusional thinking  Outcome: Progressing Towards Goal  Goal: *STG: Remains safe in hospital  Outcome: Progressing Towards Goal  Goal: *STG: Seeks staff when feelings of self harm or harm towards others arise  Outcome: Progressing Towards Goal  Goal: *STG: Patient will verbalize areas in need of boundary recognition and limit setting  Outcome: Progressing Towards Goal  Goal: *STG: Patient will develop strategies to regulate emotions and corresponding behaviors  Outcome: Progressing Towards Goal  Goal: *STG: Accept constructive criticism without injury or isolation  Outcome: Progressing Towards Goal  Goal: *STG: Participates in individual and group therapy  Outcome: Progressing Towards Goal  Goal: *STG: Develop safety plan for times when triggered in stressful situations  Outcome: Progressing Towards Goal  Goal: *STG: Patient will verbalize issues that get in their way of progress (i.e.: anger, fear etc.)  Outcome: Progressing Towards Goal  Goal: *STG/LTG: Complies with medication therapy  Outcome: Progressing Towards Goal  Goal: *STG/LTG: Demonstrates improved thought patterns as evidenced by logical and coherent speech  Outcome: Progressing Towards Goal  Goal: *STG/LTG: Demonstrates improved social functioning by responding appropriately to staff  Outcome: Progressing Towards Goal  Goal: Interventions  Outcome: Progressing Towards Goal     Problem: *Psychosis: Discharge Outcome  Goal: *Absent or Controlled Active Psychotic Symptoms  Outcome: Progressing Towards Goal     Problem: Patient Education: Go to Patient Education Activity  Goal: Patient/Family Education  Outcome: Progressing Towards Goal

## 2021-02-12 NOTE — GROUP NOTE
[facility] GROUP DOCUMENTATION INDIVIDUAL Group Therapy Note Date: 2/12/2021 Group Start Time: 1400 Group End Time: 1667 Group Topic: Education Group - Inpatient [facility] Arlene CAN 
 
[facility] GROUP DOCUMENTATION GROUP Group Therapy Note SW provided psychoeducation on self-compassion, self-esteem, and strengths building. Group members were given a handout. SW led in discussion on material and provided explanations. Group members were given the opportunity to talk about how they could build more resilient lives. Attendance: Attended Patient's Goal:  Attendance Interventions/techniques: Informed and Validated Follows Directions: Followed directions Interactions: Interacted appropriately Mental Status: Calm and Congruent Behavior/appearance: Attentive and Cooperative Goals Achieved: Able to engage in interactions, Able to listen to others, Able to give feedback to another, Discussed self-esteem issues and Identified feelings Additional Notes:  Pau Victor reports there are times that he is unhappy in himself. He reports he tries to be compassionate with himself to build self-esteem. TAYLOR Lopez, Supervisee in Social Work
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
## 2021-02-12 NOTE — GROUP NOTE
[facility] GROUP DOCUMENTATION INDIVIDUAL Group Therapy Note Date: 2/12/2021 Group Start Time: 0830 Group End Time: 0900 Group Topic: Process Group - Inpatient [facility]AL [facility] Vernell CAN 
 
[facility] GROUP DOCUMENTATION GROUP Group Therapy Note Group members participated in morning process group. SW facilitated discussion. Group members named goals, levels of anxiety and depression, and stated other feelings about treatment and plans for discharge. Attendance: Attended Patient's Goal:  Attendance Interventions/techniques: Informed, Validated and Supported Follows Directions: Followed directions Interactions: Interacted appropriately Mental Status: Calm and Congruent Behavior/appearance: Attentive and Cooperative Goals Achieved: Able to engage in interactions, Able to listen to others, Able to reflect/comment on own behavior, Discussed discharge plans and Identified feelings Additional Notes:  Sandhya Jeffrey presents AO x 3, denies SI/HI, denies current A/V hallucinations, cooperative.  Slow speech, thinking is still illogical.  Continues to have delusions. Kwadwo Francisco reports his anxiety is currently 2 of 10 and depression is 0 of 10.  Derick reports he wants to become more self-sufficient when he is released. Team will continue to monitor his medications, engage in treatment, and plan for discharge when appropriate.  Safety concerns are still present due to his delusions.   
 
TAYLOR Sanchez, Supervisee in Social Work

## 2021-02-13 PROCEDURE — 74011250637 HC RX REV CODE- 250/637: Performed by: PSYCHIATRY & NEUROLOGY

## 2021-02-13 PROCEDURE — 74011000250 HC RX REV CODE- 250: Performed by: NURSE PRACTITIONER

## 2021-02-13 PROCEDURE — 65220000003 HC RM SEMIPRIVATE PSYCH

## 2021-02-13 RX ADMIN — OLANZAPINE 15 MG: 15 TABLET, ORALLY DISINTEGRATING ORAL at 08:16

## 2021-02-13 RX ADMIN — LORAZEPAM 0.5 MG: 0.5 TABLET ORAL at 20:52

## 2021-02-13 RX ADMIN — HYDROXYZINE HYDROCHLORIDE 50 MG: 25 TABLET, FILM COATED ORAL at 10:11

## 2021-02-13 RX ADMIN — HALOPERIDOL 5 MG: 5 TABLET ORAL at 08:16

## 2021-02-13 RX ADMIN — HALOPERIDOL 5 MG: 5 TABLET ORAL at 20:52

## 2021-02-13 RX ADMIN — OLANZAPINE 15 MG: 15 TABLET, ORALLY DISINTEGRATING ORAL at 20:52

## 2021-02-13 RX ADMIN — LORAZEPAM 0.5 MG: 0.5 TABLET ORAL at 08:16

## 2021-02-13 RX ADMIN — BACITRACIN ZINC, POLYMYXIN B SULFATE, NEOMYCIN SULFATE 1 PACKET: 400; 5000; 3.5 OINTMENT TOPICAL at 08:16

## 2021-02-13 RX ADMIN — LORAZEPAM 0.5 MG: 0.5 TABLET ORAL at 13:12

## 2021-02-13 RX ADMIN — BACITRACIN ZINC, POLYMYXIN B SULFATE, NEOMYCIN SULFATE 1 PACKET: 400; 5000; 3.5 OINTMENT TOPICAL at 17:12

## 2021-02-13 NOTE — ROUTINE PROCESS
Pt. Alert and oriented x 3. Pt. States depression is a 0. Pt. States anxiety is 3. Pt. has no hallucinations. No SI/HI. Cooperative with assessment. I: Administer medications as ordered and needed, Encourage pt to attend and participate in groups, encourage pt to be up for all meals and snacks, consuming all each time, encourage pt to interact with peers in a positive manner. Q 15 minute safety checks continue. R: Compliant  with medications. does attend groups, does participate. Pt. is getting up for meals, consumes 100% of meals, snacks. Pt. does interact with peers. States\"I feel better today than I have been before and I'm hoping to go home soon\". no safety concerns at this time. P: Will continue with current plan of care. Will continue to perform Q 15 min safety checks.

## 2021-02-13 NOTE — BH NOTES
Pt. At nurses station all morning, asking for Ativan, with staff reiterating several times, it is a scheduled medication, and he will get it at the scheduled time as he has been.  Pt. Then requested PRN Atarax for anxiety.  Stated his anxiety is an 8, stating he can't concentrate, he is bored.  Writer explained different distraction techniques, versus medication to try to cope.  Pt. States, \" they never work, I just need the drugs, I mean medicine\".   Writer administered routine Ativan to pt., pt. Took a sip of water.  Writer encouraged pt to drink more.  Pt. States, \" No I got it swallowed, as he shows writer his mouth briefly. Pt. Walked into his room  To his desk, kept looking around corner at writer and staff.  Upon tech performing his 15 minute safety check, noted patient sitting at desk with a crayon and a deck of cards, Ativan crushed on desk and moving it around with one playing card, onto another card ready to snort.  Tech, asked pt. What he was doing, he shrugged his shoulders, put powder into mouth and drank water.  Playing cards removed from patients room.  Pt. Came to window after, stating, \" I do not always do that, I just wanted it to work faster, I need it bad\".  Writer explained to patient that is in appropriate behavior , Medications are to be taken as prescribed proper route.  Pt. Shrugged shoulders and states, \" I just really needed it\", and walked away from window.  Writer made Dr Vargas aware  , states, \" at time pass along to please make sure patient takes his medication by checking mouth and do not administer in bedroom\".  will pass on to staff.

## 2021-02-13 NOTE — BH NOTES
Pt. Up and down today, at nurses station requesting medications throughout the day, stating his is not working like it should, asking dr Alberto Rivas for stronger Ativan and Zyprexa. Dr. Alberto Rivas did not give any new orders.  Pt. Encouraged to utilize positive non prescription , distraction techniques for coping, to try not to rely on medication to \"feel better\"

## 2021-02-14 PROCEDURE — 74011250637 HC RX REV CODE- 250/637: Performed by: PSYCHIATRY & NEUROLOGY

## 2021-02-14 PROCEDURE — 74011000250 HC RX REV CODE- 250: Performed by: NURSE PRACTITIONER

## 2021-02-14 PROCEDURE — 65220000003 HC RM SEMIPRIVATE PSYCH

## 2021-02-14 RX ORDER — DIVALPROEX SODIUM 500 MG/1
500 TABLET, DELAYED RELEASE ORAL EVERY 12 HOURS
Status: DISCONTINUED | OUTPATIENT
Start: 2021-02-14 | End: 2021-02-23 | Stop reason: HOSPADM

## 2021-02-14 RX ADMIN — LORAZEPAM 0.5 MG: 0.5 TABLET ORAL at 20:54

## 2021-02-14 RX ADMIN — TRAZODONE HYDROCHLORIDE 50 MG: 50 TABLET ORAL at 21:49

## 2021-02-14 RX ADMIN — LORAZEPAM 0.5 MG: 0.5 TABLET ORAL at 15:00

## 2021-02-14 RX ADMIN — BACITRACIN ZINC, POLYMYXIN B SULFATE, NEOMYCIN SULFATE 1 PACKET: 400; 5000; 3.5 OINTMENT TOPICAL at 17:03

## 2021-02-14 RX ADMIN — OLANZAPINE 15 MG: 15 TABLET, ORALLY DISINTEGRATING ORAL at 08:17

## 2021-02-14 RX ADMIN — DIVALPROEX SODIUM 500 MG: 500 TABLET, DELAYED RELEASE ORAL at 20:55

## 2021-02-14 RX ADMIN — LORAZEPAM 0.5 MG: 0.5 TABLET ORAL at 08:17

## 2021-02-14 RX ADMIN — OLANZAPINE 15 MG: 15 TABLET, ORALLY DISINTEGRATING ORAL at 20:54

## 2021-02-14 RX ADMIN — BACITRACIN ZINC, POLYMYXIN B SULFATE, NEOMYCIN SULFATE 1 PACKET: 400; 5000; 3.5 OINTMENT TOPICAL at 08:17

## 2021-02-14 RX ADMIN — HALOPERIDOL 5 MG: 5 TABLET ORAL at 20:54

## 2021-02-14 RX ADMIN — HALOPERIDOL 5 MG: 5 TABLET ORAL at 08:17

## 2021-02-14 RX ADMIN — DIVALPROEX SODIUM 500 MG: 500 TABLET, DELAYED RELEASE ORAL at 10:29
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
## 2021-02-14 NOTE — BH NOTES
Pt slept overnight, remained sleeping with waking intervals to ask for lotion and use bathroom, no violent, no self harming behavior noticed or reported.

## 2021-02-14 NOTE — PROGRESS NOTES
Discussed case interviewed patient  Taking and tolerating medications well  He is asking for more medication but he might have been snorting Ativan at times  Safety issues reviewed  No depression  Positive paranoid ideations positive delusions  Attends groups interacts with some staff  Remains vigilant withdrawn  No overt aggression  Comes across as not being truthful at times  Paranoid about his roommate    Mental status exam  Alert oriented fair eye contact  Speech is goal-directed spontaneous  Affect is restricted guarded  Positive for paranoid ideations  Insight and judgment fair  At times may be responding to internal stimuli    Recommendations  Medication regimen reviewed  Safety issues discussed  Follow-up with me in the team tomorrow

## 2021-02-14 NOTE — BH NOTES
Pt. Kept begging all day to sleep in small day room again, because he doesn't want a roommate. Several times, staff has spoken with patient in private as requested throughout the day, listened, reassured, after patient in room with roommate all day no problems, pt. Threatened to staff he will do what he needs to , to not have that roommate and get elsewhere, if it means putting his face in toilet, banging head on wall, punching himself, ignored tech as tech tried to speak with him. Pt. States begging didn't work , this will. Writer moved patient to another open male bed on unit, with compatible roommate, pt. Calmed down, and said, \" that did it huh\"? Dr. Deana Castro made aware, in agreement. Pt. Psator Bedoya in new bed resting now, calm and smiling.

## 2021-02-14 NOTE — BH NOTES
Pt. fixateed on paranioa of roommate, staff splitting to try to get to sleep in small day room again tonight. Pt. Took a nap in his bed for 2 hours with roommate in room as well without issues, pt goes into room with roommate In bed without issues. No safety concerns noted. q 15 minute safety checks continue.

## 2021-02-14 NOTE — PROGRESS NOTES
Problem: Falls - Risk of  Goal: *Absence of Falls  Description: Document Joe Brothers Fall Risk and appropriate interventions in the flowsheet. Outcome: Progressing Towards Goal  Note: Fall Risk Interventions:       Mentation Interventions: Room close to nurse's station    Medication Interventions: Teach patient to arise slowly    Elimination Interventions:  Toilet paper/wipes in reach    History of Falls Interventions: Room close to nurse's station

## 2021-02-14 NOTE — PROGRESS NOTES
Discussed case interviewed patient  Continues to be medication seeking at times  There is a possibility that he might have tried to snort one of the medications  I would like to increase his lorazepam  Continues to be paranoid unstable mood  Not willing to believe put on Depakote as it helped him before  Labile affect, paranoid ideations  No overt aggression  Remains guarded paranoid    Mental status exam  Alert oriented makes fair eye contact  Guarded affect  More willing to have treatment  Thought process linear and logical  Insight and judgment fair    Recommendations  Continue inpatient treatments  I will add Depakote  mg twice daily  Follow-up with me in the team tomorrow

## 2021-02-15 PROCEDURE — 74011000250 HC RX REV CODE- 250: Performed by: NURSE PRACTITIONER

## 2021-02-15 PROCEDURE — 74011250637 HC RX REV CODE- 250/637: Performed by: PSYCHIATRY & NEUROLOGY

## 2021-02-15 PROCEDURE — 65220000003 HC RM SEMIPRIVATE PSYCH

## 2021-02-15 RX ADMIN — BACITRACIN ZINC, POLYMYXIN B SULFATE, NEOMYCIN SULFATE 1 PACKET: 400; 5000; 3.5 OINTMENT TOPICAL at 08:54

## 2021-02-15 RX ADMIN — DIVALPROEX SODIUM 500 MG: 500 TABLET, DELAYED RELEASE ORAL at 21:16

## 2021-02-15 RX ADMIN — LORAZEPAM 0.5 MG: 0.5 TABLET ORAL at 08:54

## 2021-02-15 RX ADMIN — DIVALPROEX SODIUM 500 MG: 500 TABLET, DELAYED RELEASE ORAL at 08:55

## 2021-02-15 RX ADMIN — BACITRACIN ZINC, POLYMYXIN B SULFATE, NEOMYCIN SULFATE 1 PACKET: 400; 5000; 3.5 OINTMENT TOPICAL at 18:34

## 2021-02-15 RX ADMIN — OLANZAPINE 15 MG: 15 TABLET, ORALLY DISINTEGRATING ORAL at 08:55

## 2021-02-15 RX ADMIN — HYDROXYZINE HYDROCHLORIDE 50 MG: 25 TABLET, FILM COATED ORAL at 21:16

## 2021-02-15 RX ADMIN — TRAZODONE HYDROCHLORIDE 50 MG: 50 TABLET ORAL at 21:54

## 2021-02-15 RX ADMIN — HALOPERIDOL 5 MG: 5 TABLET ORAL at 21:16

## 2021-02-15 RX ADMIN — OLANZAPINE 15 MG: 15 TABLET, ORALLY DISINTEGRATING ORAL at 21:16

## 2021-02-15 RX ADMIN — HALOPERIDOL 5 MG: 5 TABLET ORAL at 08:54

## 2021-02-15 NOTE — PROGRESS NOTES
Problem: Falls - Risk of  Goal: *Absence of Falls  Description: Document Shima Rider Fall Risk and appropriate interventions in the flowsheet. Outcome: Progressing Towards Goal  Note: Fall Risk Interventions:       Mentation Interventions: Room close to nurse's station    Medication Interventions: Teach patient to arise slowly    Elimination Interventions:  Toilet paper/wipes in reach    History of Falls Interventions: Room close to nurse's station
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
## 2021-02-15 NOTE — BH NOTES
B: Patient is alert and oriented to name. Patient is very soft spoken and very slow in getting words out. Patient continues to walk around like he is a daze. Patient constantly asking for medications. Patient is being monitored carefully to ensure he is swallowing all of his medications. Patient is very fixated on medications and is easily redirectable. Patient paces up and down the hallway in and out of bed. Just cannot be still for any period of time. Patient is able to shower daily and is dressed in hospital issued clothing. Patient inhales foods when given. Patient eats 100% of meals and snacks. Patient continues to have difficulty falling asleep at night. It takes usually around 0100 to 0200 for patient to actually fall asleep. Team feels patient is not progressing well. Team conference to be held in am to discuss change in plan of care. Will continue to monitor every 15 minutes for safety. I: Provide medications as ordered. Monitor to be sure patient tis swallowing medications. Provide safety by doing every 15 minute safety rounds. R: Patient is compliant with all of his medications. Patient bed hops from bed to bed and does not interact with any other peers. Patient cannot stay in group room for less than 10 minutes at best. Will continue to monitor every 15 minutes for safety. P; Patient has not shown any signs of self harming behaviors. Patient is working on coping skills but is not at the place where he can retain information at this time.

## 2021-02-15 NOTE — BH NOTES
Pt. Alert and oriented x4.  Pt. States depression is a 0.  Pt. States anxiety is 0  Pt. has no hallucinations.  No SI/HI.  Cooperative with assessment.   presented with demanding behavior and grandiose thoughts saying he have one million dollars and will donate to staff. Accepted HS medication and mouth checks. Given at 2150 po prn Trazodone 50mg for insomnia, then at 2335 said he is unable to sleep, encourged to stay in bed and give time for hsi medication to work, was directable. I: Administer medications as ordered and needed, Encourage pt to attend and participate in groups, encourage pt to be up for all meals and snacks, consuming all each time, encourage pt to interact with peers in a positive manner. Q 15 minute safety checks continue.     R: Compliant  with medications.  does attend groups, does participate.  Pt. Ate HS snack . Pt. does interact with peers. Rachel Biswas concerns at this time.     P: Will continue with current plan of care.  Will continue to perform Q 15 min safety checks.

## 2021-02-15 NOTE — BH NOTES
FLY contacted Beatris Coy, Derick's mom, about his care. Beatris Coy reports she wants to be contacted more and would like to call regularly. She reports she lost the SW's number. She reports she though Warren Ceja seemed better until speaking with him today. She reports he is the worst he has been. She reports she would like for an injection to be considered. She reports he has a hx of coming off of his medications because of side effects.   Sravan Best agreed to talk again tomorrow after treatment team.

## 2021-02-15 NOTE — GROUP NOTE
[facility] GROUP DOCUMENTATION INDIVIDUAL Group Therapy Note Date: 2/15/2021 Group Start Time: 7243 Group End Time: 1500 Group Topic: Education Group - Inpatient SVR 1 BEHAVIORAL HEALTH Esquivel Nap H 
 
[facility] GROUP DOCUMENTATION GROUP Group Therapy Note Group members received psychoeducation on Defense Mechanisms. A defense mechanism was defined and common defense mechanisms were identified. Group members had the opportunity to discuss their defense mechanisms. Attendance: Other Started group then left Patient's Goal:  Attendance Interventions/techniques: Supported Follows Directions: Followed directions Interactions: Unable to interact Mental Status: Anxious Behavior/appearance: Agitated and Poor eye contact Goals Achieved: Able to listen to others Additional Notes:  Belem Trujillo started group, but asked if he could be excused. He reports he did not feel well and did not think he could tolerate group. Celeste Blandon MSW, Supervisee in Social Work

## 2021-02-15 NOTE — GROUP NOTE
[facility] GROUP DOCUMENTATION INDIVIDUAL Group Therapy Note Date: 2/15/2021 Group Start Time: 9084 Group End Time: 1000 Group Topic: [employer] SVR 1 BEHAVIORAL HEALTH Mary Ramirez RN 
 
 [facility] Group Therapy Note Attendees: 5/6 Attendance: Attended Patient's Goal:  \"disharge\" Interventions/techniques: Informed, Promoted peer support and Supported Follows Directions: Followed directions Interactions: Interacted appropriately Mental Status: Calm Behavior/appearance: Attentive, Cooperative and Withdrawn/quiet Goals Achieved: Able to engage in interactions, Able to listen to others, Able to give feedback to another, Able to reflect/comment on own behavior, Identified medication adherence strategies and Identified resources and support systems Additional Notes:  Pt denies SI/HI. Pt reports he's having difficulty falling asleep. No s/s of distress noted. NO complaints of pain. Pt identified his Maria Ines Jones as his support person. Pt also stated that some of his behaviors this weekend were \"playing\", Pt states \"I was not  Suicidal\", states he wanted \"out of that room\" Christie Lee RN

## 2021-02-15 NOTE — GROUP NOTE
[facility] GROUP DOCUMENTATION INDIVIDUAL Group Therapy Note Date: 2/15/2021 Group Start Time: 6348 Group End Time: 1200 Group Topic: Activity Therapy SVR 1 BEHAVIORAL HEALTH Anita Ventura RN 
 
 [facility] Group Therapy Note: Luca Activity Review community resources & triggers. Attendees: 5/6 Attendance: Attended Patient's Goal:  \"discharge\" Interventions/techniques: Informed, Promoted peer support and Role playing Follows Directions: Followed directions Interactions: Interacted appropriately Mental Status: Calm Behavior/appearance: Attentive and Cooperative Goals Achieved: Able to engage in interactions, Identified triggers and Identified resources and support systems Additional Notes:  Pt attended group, participated well. Pt did not interact socially with staff nor peers. Pt did identify his triggers as \"boredom, activities I don't like\". Pt identified his personal community resource as \"[employer]\".  
 
Sung Esquivel RN
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
## 2021-02-16 PROCEDURE — 65220000003 HC RM SEMIPRIVATE PSYCH

## 2021-02-16 PROCEDURE — 74011250637 HC RX REV CODE- 250/637: Performed by: PSYCHIATRY & NEUROLOGY

## 2021-02-16 PROCEDURE — 74011000250 HC RX REV CODE- 250: Performed by: NURSE PRACTITIONER

## 2021-02-16 RX ADMIN — BACITRACIN ZINC, POLYMYXIN B SULFATE, NEOMYCIN SULFATE 1 PACKET: 400; 5000; 3.5 OINTMENT TOPICAL at 18:16

## 2021-02-16 RX ADMIN — OLANZAPINE 15 MG: 15 TABLET, ORALLY DISINTEGRATING ORAL at 20:41

## 2021-02-16 RX ADMIN — HALOPERIDOL 5 MG: 5 TABLET ORAL at 20:41

## 2021-02-16 RX ADMIN — DIVALPROEX SODIUM 500 MG: 500 TABLET, DELAYED RELEASE ORAL at 20:41

## 2021-02-16 RX ADMIN — TRAZODONE HYDROCHLORIDE 50 MG: 50 TABLET ORAL at 21:55

## 2021-02-16 RX ADMIN — HYDROXYZINE HYDROCHLORIDE 50 MG: 25 TABLET, FILM COATED ORAL at 23:58

## 2021-02-16 RX ADMIN — HALOPERIDOL 5 MG: 5 TABLET ORAL at 08:17

## 2021-02-16 RX ADMIN — DIVALPROEX SODIUM 500 MG: 500 TABLET, DELAYED RELEASE ORAL at 08:17

## 2021-02-16 RX ADMIN — BACITRACIN ZINC, POLYMYXIN B SULFATE, NEOMYCIN SULFATE 1 PACKET: 400; 5000; 3.5 OINTMENT TOPICAL at 08:16

## 2021-02-16 RX ADMIN — HYDROXYZINE HYDROCHLORIDE 50 MG: 25 TABLET, FILM COATED ORAL at 15:53

## 2021-02-16 RX ADMIN — OLANZAPINE 15 MG: 15 TABLET, ORALLY DISINTEGRATING ORAL at 08:16

## 2021-02-16 NOTE — PROGRESS NOTES
Discussed case interviewed patient  Continues to have difficulties with medication seeking and trying to use inappropriately medications  Frequency nursing station asks for things especially medication needs a  Needs a lot of redirection  We have discussed safety issues  He sometimes makes sense but other times is confused  Medication regimen reviewed  As he was attempting to take Ativan inappropriately we discussed that we will discontinue this medication  He is in agreement with this    Mental status exam  Alert oriented fair eye contact  Positive paranoid ideations  Anxious short attention span  Can be manipulative at times  Insight and judgment poor to fair  Speech is mostly goal-directed  Affect is labile    Recommendations  We will discontinue Ativan  He started on Depakote 500 mg twice daily which may be helpful  Also on Zyprexa and Haldol  Continue inpatient treatments  Follow-up with psychiatric team tomorrow

## 2021-02-16 NOTE — BH NOTES
Pt. Alert and oriented x  3. Physical Assessment was done et wnl. No c/o pain voiced. Pt. Calm et cooperative. Pt. States depression is a 0/10. Pt. States anxiety is 0/10. Pt. Denies having hallucinations. Pt. Denies SI/HI. I: Administer medications as ordered and needed, Encourage pt to attend and participate in groups, encourage pt to be up for all meals and snacks, consuming all each time, encourage pt to interact with peers in a positive manner. Q 15 minute safety checks continue. R: Pt. compliant with medications. does attend groups, does participate. Pt. is getting up for meals, consumes 100% of meals, snacks. Pt. does interact with peers. no safety concerns at this time. P:Will cont. To monitor q15min. Checks for safety. Administer medications as ordered et as needed. Will cont. To encourage group attendance et participation.

## 2021-02-16 NOTE — GROUP NOTE
[facility] GROUP DOCUMENTATION INDIVIDUAL Group Therapy Note Date: 2021 Group Start Time: 1400 Group End Time: 7249 Group Topic: Education Group - Inpatient SVR 1 BEHAVIORAL HEALTH Yi CAN 
 
[facility] GROUP DOCUMENTATION GROUP Group Therapy Note SW facilitated discussion and provided psychoeducation on self-exploration using the SELF model. Group members identified their continuum of safety, troubling emotions, losses, and sense of future. Attendance: Attended Patient's Goal:  Attendance Interventions/techniques: Informed, Validated and Supported Follows Directions: Followed directions Interactions: Interacted appropriately Mental Status: Calm Behavior/appearance: Attentive and Cooperative Goals Achieved: Able to engage in interactions, Able to listen to others and Able to self-disclose Additional Notes:  Leena Gordon reports he has not coped with the loss of his grandmother. He reports she  three years ago. He also reports his older brother  in  and that they had not been in contact in 2 years, but minimally. TAYLOR Lockett, Supervisee in Social Work

## 2021-02-16 NOTE — PROGRESS NOTES
Treatment Team meeting held. Present: Dr. Olga Dow, RN Clinical Coordinator, Sixto Urban , and Darlin Hashimoto, RN Charge Nurse. Patient seen in room with team. Patient remains delusional and has a difficult time staying on topic. Patient is constantly medication seeking. States he refused to take the Ativan all day yesterday. Patient requested to be placed on Effexor. Difficult in trying to find out where patient's baseline is. He has a long history of using drugs and has been in and out of Psych facilites several times over the years. Patient has not been on his new medications long and will evaluate daily to see if medications are effective. Discussed getting patient on Injection prior to discharge to assist and decrease in number of inpatient hospitalizations.

## 2021-02-16 NOTE — BH NOTES
Patient is at he nursing station asking for a bandaid for his arm on left [facility] area for a old sore that he wanted covered. Patient contacted several more times shortly thereafter with attention seeking behavior. Patient denies SI/HI/AVH and is cooperative but showing signs of anxiety  2038 Patient asking to take his medications early but does not want ativan per Patient. 2116 PRN 50 mg po atarax was given for anxiety along with scheduled medication. 2130 patient asked to sleep in dayroom due to roommate snoring but was redirected back to his room.

## 2021-02-16 NOTE — BH NOTES
Pt. Has been visible on the unit, ambulating in the halls. Pt. Ate meals in the dining room. Pt. Attended et participated in group meetings. Will cont. To monitor q15min. Checks for safety.

## 2021-02-16 NOTE — BH NOTES
Ria Dakins mother, contacted SW in reference to Derick's care. She reports that she was concerned he was on so much medicine. She also expressed concerned that he may try to hurt himself. She reports that he did not verbalize this, but she feels this way. She verbalizes that he has not had stability for several years though he had some for a short period of time. She reports that he has never stayed on medicine and has gone in and out of hospitals. SW offered to transfer her to nursing staff to talk about medications, but she expressed that she felt better at the moment and did not need to do so.

## 2021-02-16 NOTE — PROGRESS NOTES
Discussed case interviewed patient  Continues to have some disorganized thinking and behavior  Paranoid believes that there is a volcano under [address] and tolerating medications well  We agreed to stop his benzodiazepine  Working on trying to hold it together but has some difficulty  Unstable mood at times not thinking logically  Tries to maintain safe behaviors on the unit  Eating and sleeping okay  No overt aggression    Mental status exam  Alert oriented struggling with delusions and paranoid ideations  Unstable mood  Wants to go home but has some difficulty holding it together to do so  Positive paranoid ideations  Speech is goal-directed  At times tangential in his thinking  Insight and judgment fair    Recommendations  Continue inpatient treatments  Medication regimen reviewed  Discussed in treatment team  We are also open to input from family  Follow-up with me in the team tomorrow

## 2021-02-16 NOTE — GROUP NOTE
[facility] GROUP DOCUMENTATION INDIVIDUAL Group Therapy Note Date: 2/16/2021 Group Start Time: 0830 Group End Time: 0900 Group Topic: Process Group - Inpatient SVR 1 BEHAVIORAL HEALTH Krys CAN 
 
[facility] GROUP DOCUMENTATION GROUP Group Therapy Note Group members participated in process group. Group members identified goals, anxiety levels, depression levels, and  lessons learned in inpatient treatment. Attendance: Attended Patient's Goal:  Attendance Interventions/techniques: Informed and Validated Follows Directions: Followed directions Interactions: Interacted appropriately Mental Status: Calm and Delusions Behavior/appearance: Attentive and Cooperative Goals Achieved: Able to engage in interactions, Able to listen to others, Able to receive feedback, Able to self-disclose and Identified feelings Additional Notes:  [facility] presents AO x 3, denies SI/HI, denies current A/V hallucinations, cooperative.  Slow speech, thinking is still illogical.  Paranoid and appears highly anxious. Verbalizes he believes night staff are out to get him. When asked why he believed night staff were out to harm him he stated he did not know.  offered to talk to any staff member or report any wrongdoings. Continues to have delusions.    He reports his anxiety is currently 1 of 10 and depression is 0 of 10.  He is participating in group meetings. [facility] was caught attempting to hold medicine in order to snort it on Saturday. Team will continue to monitor his medications, engage in treatment, and plan for discharge when appropriate.  Safety concerns are still present due to his delusions.   
 
TAYLOR oJseph, Supervisee in Social Work

## 2021-02-16 NOTE — PROGRESS NOTES
Problem: Psychosis  Goal: *STG: Patient will verbalize areas in need of boundary recognition and limit setting  Outcome: Not Progressing Towards Goal  Goal: *STG/LTG: Complies with medication therapy  Outcome: Progressing Towards Goal

## 2021-02-16 NOTE — BH NOTES
Behavioral Health Treatment Team Note      Patient goal(s) for today: Due what I can to go home  Treatment team focus/goals: Engage in treatment, contact family, monitor medication     Progress note: Derick presents AO x 3, denies SI/HI, denies current A/V hallucinations, cooperative.  Slow speech, thinking is still illogical.  Paranoid and appears highly anxious. Verbalizes he believes night staff are out to get him. When asked why he believed night staff were out to harm him he stated he did not know. SW offered to talk to any staff member or report any wrongdoings. Continues to have delusions.    He reports his anxiety is currently 1 of 10 and depression is 0 of 10.  He is participating in group meetings. Calderon Gregory was caught attempting to hold medicine in order to snort it on Saturday. Team will continue to monitor his medications, engage in treatment, and plan for discharge when appropriate.  Safety concerns are still present due to his delusions.           FXF:  5                       [id number]: 60     Insurance info/prescription coverage:  Mother states he has [place] Medicaid  Date of last family contact:  2/16/2021  Family requesting physician contact today:  no  Discharge plan:  Discharge to family or possible crisis stabilization.   Guns in the home:  no   Outpatient provider(s):  None at this time     Participating treatment team members: Derick Fajardo, Gregory Mccall MSW, Supervisee in Social Work, Isabel Soto MD, Juanito Beebe RN

## 2021-02-17 PROCEDURE — 65220000003 HC RM SEMIPRIVATE PSYCH

## 2021-02-17 PROCEDURE — 74011250637 HC RX REV CODE- 250/637: Performed by: PSYCHIATRY & NEUROLOGY

## 2021-02-17 PROCEDURE — 74011000250 HC RX REV CODE- 250: Performed by: NURSE PRACTITIONER

## 2021-02-17 RX ORDER — ARIPIPRAZOLE 400 MG
400 KIT INTRAMUSCULAR ONCE
Status: COMPLETED | OUTPATIENT
Start: 2021-02-17 | End: 2021-02-17

## 2021-02-17 RX ADMIN — DIVALPROEX SODIUM 500 MG: 500 TABLET, DELAYED RELEASE ORAL at 20:51

## 2021-02-17 RX ADMIN — DIVALPROEX SODIUM 500 MG: 500 TABLET, DELAYED RELEASE ORAL at 08:23

## 2021-02-17 RX ADMIN — OLANZAPINE 15 MG: 15 TABLET, ORALLY DISINTEGRATING ORAL at 08:23

## 2021-02-17 RX ADMIN — ARIPIPRAZOLE 400 MG: KIT at 15:11

## 2021-02-17 RX ADMIN — BACITRACIN ZINC, POLYMYXIN B SULFATE, NEOMYCIN SULFATE 1 PACKET: 400; 5000; 3.5 OINTMENT TOPICAL at 18:22

## 2021-02-17 RX ADMIN — TRAZODONE HYDROCHLORIDE 50 MG: 50 TABLET ORAL at 20:52

## 2021-02-17 RX ADMIN — BACITRACIN ZINC, POLYMYXIN B SULFATE, NEOMYCIN SULFATE 1 PACKET: 400; 5000; 3.5 OINTMENT TOPICAL at 08:23

## 2021-02-17 RX ADMIN — OLANZAPINE 15 MG: 15 TABLET, ORALLY DISINTEGRATING ORAL at 20:52

## 2021-02-17 RX ADMIN — HALOPERIDOL 5 MG: 5 TABLET ORAL at 08:23

## 2021-02-17 RX ADMIN — HALOPERIDOL 5 MG: 5 TABLET ORAL at 20:51

## 2021-02-17 NOTE — BH NOTES
Pt. Alert and oriented x 3. Pt. States depression is a 0. Pt. States anxiety is 0. Pt. Denies hallucinations. Denies SI/HI. Complies with assessment. I: Administer medications as ordered and needed, Encourage pt to attend and participate in groups, encourage pt to be up for all meals and snacks, consuming all each time, encourage pt to interact with peers in a positive manner. Q 15 minute safety checks continue. R: Complies with medications. does attend groups, does participate. Pt. is getting up for meals, consumes 50% of meals, snacks. Pt. does interact with peers. no safety concerns at this time. P: Patient is alert and oriented and has been cooperative this shift. He is interacting with patients and staff in a positive manner but still has Hinduism delusions. An order abilify IM injection was given and administered to patient  And he stated\" I need to die peacefully so the sun does not blow up\". Patient was reoriented to place and time but stated \"I am the son of god I cannot get anything but real medicine\" . When asked what his goal for the day was he stated\"I am going to catch up on some sleep\". Lungs clear on auscultation, bowel sounds present and active in all quadrants, skin warm and dry to touch.

## 2021-02-17 NOTE — BH NOTES
Rested well during shift. Continues to hop from bed to bed. Continues to ask for medications off scheduled. Will continue with q15 min safety checks.

## 2021-02-17 NOTE — BH NOTES
1530 Patient has been up in hallway most of evening. Up for snacks. Patient agreed to take injection of Abillify 400 mg IM. Injection given to left deltoid. Patient tolerated injection well. Patient knows he is to have injection again in 30 days. Will continue to monitor every 15 minutes for patient safety.

## 2021-02-17 NOTE — GROUP NOTE
[facility] GROUP DOCUMENTATION INDIVIDUAL Group Therapy Note Date: 2/17/2021 Group Start Time: 0830 Group End Time: 0900 Group Topic: Process Group - Inpatient SVR 1 BEHAVIORAL HEALTH Linda  H 
 
[facility] GROUP DOCUMENTATION GROUP Group Therapy Note Group members participated in morning process group. Members identified feelings, anxiety level, depression level, and daily goals. Group members also shared what they wanted to work on about themselves. Attendance: Attended Patient's Goal:  Attendance Interventions/techniques: Informed, Validated and Supported Follows Directions: Followed directions Interactions: Interacted appropriately Mental Status: Calm and Congruent Behavior/appearance: Attentive and Cooperative Goals Achieved: Able to listen to others, Able to receive feedback and Discussed coping Additional Notes:  Sandra Boggs presents AO x 3, denies SI/HI, denies current A/V hallucinations, cooperative.  Slow speech, thinking is still illogical.  Paranoid and appears highly anxious. He verbalizes that he recognizes his symptoms.  Continues to have delusions.    He reports his anxiety is currently 0 of 10 and depression is 0 of 10.  Team will continue to monitor his medications, engage in treatment, and plan for discharge when appropriate.  Dr. Gregory Busby has ordered an Abilify injection. Safety concerns are still present due to his delusions.  
 
TAYLOR Gibson, Supervisee in Social Work

## 2021-02-17 NOTE — BH NOTES
Pt. Alert and oriented x 4. Pt. States depression is a 0. Pt. States anxiety is 2. Pt. Denies hallucinations. Denies SI/HI. Cooperative with assessment. Pt constantly asking for medications. Monitoring pt for swallowing medications. Not giving medications in pt room. I: Administer medications as ordered and needed, Encourage pt to attend and participate in groups, encourage pt to be up for all meals and snacks, consuming all each time, encourage pt to interact with peers in a positive manner. Pt monitored to make sure he is swallowing medications. Q 15 minute safety checks continue. R: Compliant with medications. does attend groups, does participate. Pt. is getting up for meals, consumes all of meals, snacks. Pt. does interact with peers. Pt hops from bed to bed. no safety concerns at this time    P: Will continue to be compliant with medications. Continue with plan of care as ordered.

## 2021-02-17 NOTE — BH NOTES
Behavioral Health Treatment Team Note      Patient goal(s) for today: Due what I can to go home  Treatment team focus/goals: Engage in treatment, contact family, monitor medication     Progress note: Derick presents AO x 3, denies SI/HI, denies current A/V hallucinations, cooperative.  Slow speech, thinking is still illogical.  Paranoid and appears highly anxious. He verbalizes that he recognizes his symptoms. Continues to have delusions.    He reports his anxiety is currently 0 of 10 and depression is 0 of 10.  Team will continue to monitor his medications, engage in treatment, and plan for discharge when appropriate.  Dr. Rudolph Emery has ordered an Abilify injection. Safety concerns are still present due to his delusions.           LOS:  10                       Expected LOS: 14     Insurance info/prescription coverage:  Mother states he has [place] Medicaid  Date of last family contact:  2/16/2021  Family requesting physician contact today:  no  Discharge plan:  Discharge to family or possible crisis stabilization.   Guns in the home:  no   Outpatient provider(s):  None at this time     Participating treatment team members: Gregory Araiza [facility], Supervisee in Social Work, Shelton Burr MD, Ruth Thompson RN

## 2021-02-18 PROCEDURE — 65220000003 HC RM SEMIPRIVATE PSYCH

## 2021-02-18 PROCEDURE — 74011250637 HC RX REV CODE- 250/637: Performed by: PSYCHIATRY & NEUROLOGY

## 2021-02-18 PROCEDURE — 74011000250 HC RX REV CODE- 250: Performed by: NURSE PRACTITIONER

## 2021-02-18 RX ADMIN — ACETAMINOPHEN 650 MG: 325 TABLET ORAL at 17:25

## 2021-02-18 RX ADMIN — HYDROXYZINE HYDROCHLORIDE 50 MG: 25 TABLET, FILM COATED ORAL at 16:30

## 2021-02-18 RX ADMIN — HALOPERIDOL 5 MG: 5 TABLET ORAL at 20:30

## 2021-02-18 RX ADMIN — OLANZAPINE 15 MG: 15 TABLET, ORALLY DISINTEGRATING ORAL at 08:16

## 2021-02-18 RX ADMIN — OLANZAPINE 15 MG: 15 TABLET, ORALLY DISINTEGRATING ORAL at 20:30

## 2021-02-18 RX ADMIN — HALOPERIDOL 5 MG: 5 TABLET ORAL at 08:16

## 2021-02-18 RX ADMIN — DIVALPROEX SODIUM 500 MG: 500 TABLET, DELAYED RELEASE ORAL at 08:16

## 2021-02-18 RX ADMIN — DIVALPROEX SODIUM 500 MG: 500 TABLET, DELAYED RELEASE ORAL at 20:30

## 2021-02-18 RX ADMIN — BACITRACIN ZINC, POLYMYXIN B SULFATE, NEOMYCIN SULFATE 1 PACKET: 400; 5000; 3.5 OINTMENT TOPICAL at 08:16

## 2021-02-18 NOTE — PROGRESS NOTES
Discussed case interviewed patient  Taking and tolerating medications well  Continues to be confused disorganized in his thinking  We have considered Abilify Maintena 400 mg IM as his compliance with medication after discharge may be a questionable at this point  Remains anxious, confused at times  Less drug-seeking than before  Affect is labile  Needs frequent redirection short attention span  Family is involved  with treatment team providing information to us and also support of Lukasz Rios  Denies hearing voices, positive paranoid ideations  Says he is less depressed    Mental status exam  Alert oriented fair eye contact  Appears to be doing a little bit better today  No overt aggression pledges for safety on the unit  Mood is okay affect is restricted  Thought process linear and logical  Insight and judgment fair    Recommendations  Continue inpatient treatments  Abilify Maintena 400 mg IM will be instituted today  Continue other medications as previously  Follow-up with me in the team again tomorrow

## 2021-02-18 NOTE — PROGRESS NOTES
Pt is progressing toward his goals. Continues to make delusional comments to staff and peers. Compliant with meds and asks frequently for more meds. Religiously preoccupied with the devil following him. Safe on unit.

## 2021-02-18 NOTE — BH NOTES
Pt. Alert and oriented x 3. Pt. States depression is a 0. Pt. States anxiety is 0. Pt. denies hallucinations. Denies SI/HI. Complies with assessment. I: Administer medications as ordered and needed, Encourage pt to attend and participate in groups, encourage pt to be up for all meals and snacks, consuming all each time, encourage pt to interact with peers in a positive manner. Q 15 minute safety checks continue. R: Complies with medications. does not attend groups, does not participate. Pt. is getting up for meals, consumes 75% of meals, snacks. Pt. does not interact with peers. No safety concerns at this time. P:   Patient is calm and cooperative but still delusional. He asked to speak to charge nurse and stated\" That other patient is trying to impersonate me but I am the son of God and he is not I do not want to hurt anyone because that does help any one\". He was reoriented that no on was trying to impersonate him and to continues to remain calm. He then came to the nurses station and stated \"check my facebook page my true nature is on there\". Lung clear on auscutlation, bowel sounds present and active in all quadrants, skin warm and dry to touch.

## 2021-02-18 NOTE — GROUP NOTE
[facility] GROUP DOCUMENTATION INDIVIDUAL Group Therapy Note Date: 2/18/2021 Group Start Time: 1400 Group End Time: 0530 Group Topic: Education Group - Inpatient SVR 1 BEHAVIORAL HEALTH Kamron CNA 
 
[facility] GROUP DOCUMENTATION GROUP Group Therapy Note SW utilized psychoeducational intervention using CBT model. Core beliefs were explained and discussed. The relationship between thoughts, feelings, and behaviors was discussed. Group members identified positive and negative core beliefs. Finally, group members received psychoeducation on challenging irrational core beliefs and coping with strong negative thoughts. Attendance: Attended Patient's Goal:  Attendance Interventions/techniques: Informed and Validated Follows Directions: Followed directions Interactions: Interacted appropriately Mental Status: Calm Behavior/appearance: Attentive and Cooperative Goals Achieved: Able to listen to others Additional Notes:  Katie Guzman was mostly quiet during group. He came to group late today. TAYLOR Duran, Supervisee in Social Work

## 2021-02-18 NOTE — BH NOTES
Quiet night with no problems noted on rounds. Rested well with no complaints voiced. Safe on unit will continue to monitor.
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
## 2021-02-18 NOTE — PROGRESS NOTES
Discussed case interviewed patient  Continues to have paranoid ideations  Ongoing delusions which she mentioned to staff yesterday  Difficulty holding it to have together, disorganized thinking  Abilify Maintena 400 mg IM utilized yesterday  Says he wants to have a good day  Attend some groups interacts with some peers and staff  Difficulty holding it together at times  Slept better last night  No overt aggression    Mental status exam  Alert oriented fair eye contact  Speech is goal-directed soft tone  Mood is depressed affect is restricted  Thought process tangential and mostly logical  Insight and judgment fair  Preoccupied, short attention span    Recommendations  Continue inpatient treatments  He may need further hospitalization  Follow-up with me in the team tomorrow

## 2021-02-18 NOTE — BH NOTES
Behavioral Health Treatment Team Note      Patient goal(s) for today: Do what I can to go home  Treatment team focus/goals: Engage in treatment, contact family, monitor medication     Progress note: Derick presents AO x 3, denies SI/HI, denies current A/V hallucinations, cooperative.  Slow speech, thinking is still illogical.  Paranoid and appears highly anxious. He verbalizes that he recognizes his symptoms.  Continues to have delusions.    He reports his anxiety is currently 0 of 10 and depression is 0 of 10.  Team will continue to monitor his medications, engage in treatment, and plan for discharge when appropriate. Abilify 400 mg injection given. Safety concerns are still present due to his delusions.           LOS:  11                       Expected LOS: 14     Insurance info/prescription coverage:  Mother states he has [place] Medicaid  Date of last family contact:  2/17/2021  Family requesting physician contact today:  no  Discharge plan:  Discharge to family or possible crisis stabilization.   Guns in the home:  no   Outpatient provider(s):  None at this time     Participating treatment team members: Gregory Araiza, Supervisee in Social Work, Alla Anthony MD, Asa Mata RN

## 2021-02-18 NOTE — GROUP NOTE
[facility] GROUP DOCUMENTATION INDIVIDUAL Group Therapy Note Date: 2/18/2021 Group Start Time: 1400 Group End Time: 2148 Group Topic: Medication SVR 1 BEHAVIORAL HEALTH Evelyne Baires RN 
 
[facility] GROUP DOCUMENTATION GROUP Group Therapy Note Attendees:  
 
  
 
Attendance: Attended Patient's Goal: Interventions/techniques: Informed Follows Directions: Followed directions Interactions: Interacted appropriately Mental Status: Delusions Behavior/appearance: Cooperative Goals Achieved: Able to receive feedback Additional Notes:  Remains delusional and says \"I am the son of God\" Mic Costa RN
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
## 2021-02-19 PROCEDURE — 74011000250 HC RX REV CODE- 250: Performed by: NURSE PRACTITIONER

## 2021-02-19 PROCEDURE — 74011250637 HC RX REV CODE- 250/637: Performed by: PSYCHIATRY & NEUROLOGY

## 2021-02-19 PROCEDURE — 65220000003 HC RM SEMIPRIVATE PSYCH

## 2021-02-19 RX ADMIN — TRAZODONE HYDROCHLORIDE 50 MG: 50 TABLET ORAL at 21:41

## 2021-02-19 RX ADMIN — HALOPERIDOL 5 MG: 5 TABLET ORAL at 21:41

## 2021-02-19 RX ADMIN — HALOPERIDOL 5 MG: 5 TABLET ORAL at 08:24

## 2021-02-19 RX ADMIN — BACITRACIN ZINC, POLYMYXIN B SULFATE, NEOMYCIN SULFATE 1 PACKET: 400; 5000; 3.5 OINTMENT TOPICAL at 08:24

## 2021-02-19 RX ADMIN — DIVALPROEX SODIUM 500 MG: 500 TABLET, DELAYED RELEASE ORAL at 08:24

## 2021-02-19 RX ADMIN — BACITRACIN ZINC, POLYMYXIN B SULFATE, NEOMYCIN SULFATE 1 PACKET: 400; 5000; 3.5 OINTMENT TOPICAL at 18:16

## 2021-02-19 RX ADMIN — DIVALPROEX SODIUM 500 MG: 500 TABLET, DELAYED RELEASE ORAL at 21:41

## 2021-02-19 RX ADMIN — OLANZAPINE 15 MG: 15 TABLET, ORALLY DISINTEGRATING ORAL at 21:41

## 2021-02-19 RX ADMIN — OLANZAPINE 15 MG: 15 TABLET, ORALLY DISINTEGRATING ORAL at 08:24
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
## 2021-02-19 NOTE — SUICIDE SAFETY PLAN
SAFETY PLAN    A suicide Safety Plan is a document that supports someone when they are having thoughts of suicide. Warning Signs that indicate a suicidal crisis may be developing: What (situations, thoughts, feelings, body sensations, behaviors, etc.) do you experience that lets you know you are beginning to think about suicide? 1. Confusion  2. Agitation  3. Thoughts of self-harm    Internal Coping Strategies:  What things can I do (relaxation techniques, hobbies, physical activities, etc.) to take my mind off my problems without contacting another person? 1. Meditation  2. Exercise  3. Deep Breathing    People and social settings that provide distraction: Who can I call or where can I go to distract me? 1. Name: Mom  Phone: [phone]  2. Name: Velia  Phone: [phone]   3. Name: Savage Persons         Phone:   [phone]                People whom I can ask for help: Who can I call when I need help - for example, friends, family, clergy, someone else? 1. Name: Mom  Phone: [phone]  2. Name: Velia  Phone: [phone]   3. Name: Savage Persons         Phone:   [phone]                Professionals or [address] [place] [address] Blvd I can contact during a crisis: Who can I call for help - for example, my doctor, my psychiatrist, my psychologist, a mental health provider, a suicide hotline? 1. Clinician Name:  Juju Harman Dixon    Phone: [phone]         Clinician Pager or Emergency Contact #: [phone]    2. Clinician Name: Bassem Henderson MD   Phone: [phone]       3. Suicide Prevention Lifeline: 5-617-855-TALK (0144)    4. [address] [place] [address] Emergency Services -  for example, [facility], [facility] suicide hotline, [facility] Hotline: Mobile Crisis Program            Emergency Services Phone: Conchita Smith 613    Making the environment safe: How can I make my environment (house/apartment/living space) safer?  For example, can I remove guns, medications, and other items? 1. Remove weapons  2.  Contact another person in your support system

## 2021-02-19 NOTE — BH NOTES
Pt. Alert and oriented x 4. Pt. States depression is a 4. Pt. States anxiety is 0. Pt. Denies hallucinations. Denies SI/HI. Cooperative with assessment. Pt. States he is feeling better, quiet this am.  I: Administer medications as ordered and needed,  Encourage pt to attend and participate in groups, encourage pt to be up for all meals and snacks, consuming all each time, encourage pt to interact with peers in a positive manner. Q 15 minute safety checks continue. R: Compliant with medications. does attend groups, does not participate. Pt. is getting up for meals, consumes all of meals, snacks. Pt. does not interact with peers. no safety concerns at this time. P: Pt. Will continue to utilitze positive coping skills.
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
## 2021-02-19 NOTE — BH NOTES
Behavioral Health Treatment Team Note      Patient goal(s) for today: Do what I can to go home  Treatment team focus/goals: Engage in treatment, contact family, monitor medication     Progress note: Derick presents AO x 3, denies SI/HI, denies current A/V hallucinations, cooperative.  Slow speech, thinking is more logical.  Paranoid and appears highly anxious at times on the unit. He verbalizes that he recognizes his symptoms.  Continues to have delusions.    He reports his anxiety is currently 0 of 10 and depression is 0 of 10.  Family states he cannot go back to his uncle's to live, but mother reports staying with her may be an option.  His mother reports he sounded better on the phone.  Team will continue to monitor his medications, engage in treatment, and plan for discharge when appropriate. Safety concerns are still present due to his delusions.          LOS:  12                       Expected LOS: 14     Insurance info/prescription coverage:  [facility]  Date of last family contact:  2/19/2021  Family requesting physician contact today:  no  Discharge plan:  Discharge to family or possible crisis stabilization.  Guns in the home:  no   Outpatient provider(s):  None at this time     Participating treatment team members: Gregory Araiza, Supervisee in Social Work, Jen Vargas MD, Georgiana Hanson RN

## 2021-02-19 NOTE — GROUP NOTE
IP  GROUP DOCUMENTATION INDIVIDUAL Group Therapy Note Date: 2/19/2021 Group Start Time: 1400 Group End Time: 1430 Group Topic: Education Group - Inpatient SVR 1 BEHAVIORAL HEALTH [facility] 
 
IP  GROUP DOCUMENTATION GROUP Group Therapy Note Safety Planning group to prepare for discharge. Warning signs, coping skills, and support systems were identified. Attendance: Did not attend Creta Canavan, MSW, Supervisee in Social Work

## 2021-02-19 NOTE — BH NOTES
FLY contacted Yassine Lyman, Derick's mom, to talk about discharge options. Yassine Lyman stated that he could come live with her for awhile to get back to [place]. She expresses concern that he may be released too early and lose progress. [facility] She agreed that she will pick him up upon discharge. FLY agreed to express concerns to Dr. Delmi Castellano. FLY explained that Ni Mario could be at his new normal at this time.

## 2021-02-19 NOTE — PROGRESS NOTES
Discussed case interviewed patient  Continues to have paranoid ideations  Showing some improvements but still could not be able to function fully outside the hospital and current status  We decided to continue hospitalization at this time to give medications a little bit more time to work  Also psychosocial interventions are helpful  Discussed safety issues  And encouraged him to be less drug-seeking and participate in his treatment  Eating and sleeping better  Medication regimen reviewed  Taking and tolerating medications well    Mental status exam  Alert oriented fair eye contact  Slightly less confused more organized in thinking  But still has scattered thoughts  Seems a bit less paranoid  Affect is guarded  More redirectable    Recommendations  Continue inpatient treatments  Safety issues reviewed  If he continues to improve we will plan discharge Monday

## 2021-02-20 PROCEDURE — 74011250637 HC RX REV CODE- 250/637: Performed by: PSYCHIATRY & NEUROLOGY

## 2021-02-20 PROCEDURE — 74011000250 HC RX REV CODE- 250: Performed by: NURSE PRACTITIONER

## 2021-02-20 PROCEDURE — 65220000003 HC RM SEMIPRIVATE PSYCH

## 2021-02-20 RX ADMIN — TRAZODONE HYDROCHLORIDE 50 MG: 50 TABLET ORAL at 21:44

## 2021-02-20 RX ADMIN — ACETAMINOPHEN 650 MG: 325 TABLET ORAL at 19:29

## 2021-02-20 RX ADMIN — HALOPERIDOL 5 MG: 5 TABLET ORAL at 08:09

## 2021-02-20 RX ADMIN — OLANZAPINE 15 MG: 15 TABLET, ORALLY DISINTEGRATING ORAL at 08:09

## 2021-02-20 RX ADMIN — BACITRACIN ZINC, POLYMYXIN B SULFATE, NEOMYCIN SULFATE 1 PACKET: 400; 5000; 3.5 OINTMENT TOPICAL at 18:14

## 2021-02-20 RX ADMIN — DIVALPROEX SODIUM 500 MG: 500 TABLET, DELAYED RELEASE ORAL at 21:44

## 2021-02-20 RX ADMIN — DIVALPROEX SODIUM 500 MG: 500 TABLET, DELAYED RELEASE ORAL at 08:09

## 2021-02-20 RX ADMIN — HALOPERIDOL 5 MG: 5 TABLET ORAL at 21:44

## 2021-02-20 RX ADMIN — BACITRACIN ZINC, POLYMYXIN B SULFATE, NEOMYCIN SULFATE 1 PACKET: 400; 5000; 3.5 OINTMENT TOPICAL at 08:36

## 2021-02-20 RX ADMIN — OLANZAPINE 15 MG: 15 TABLET, ORALLY DISINTEGRATING ORAL at 21:44

## 2021-02-20 NOTE — BH NOTES
Pt. Alert and oriented x 3. Pt. States depression is a 3. Pt. States anxiety is 100%. Pt. denies hallucinations. Denies SI/HI. No complaints with assessment. I: Administer medications as ordered and needed, Encourage pt to attend and participate in groups, encourage pt to be up for all meals and snacks, consuming all each time, encourage pt to interact with peers in a positive manner. Q 15 minute safety checks continue. R: Compliant with medications. does attend groups, does participate. Pt. is getting up for meals, consumes 100% of meals, snacks. Pt. does interact with peers. no safety concerns at this time. P: Patient will continue to remain safe and continue taking a presribed therapies.

## 2021-02-20 NOTE — BH NOTES
Patient has been to nursing station numerous times asking for water cup refills ,asking for his medication early basically attention seeking behaviors.  Patient is medication compliant  2141 PRN trazodone 50 mg po was given for sleep

## 2021-02-21 PROCEDURE — 65220000003 HC RM SEMIPRIVATE PSYCH

## 2021-02-21 PROCEDURE — 74011000250 HC RX REV CODE- 250

## 2021-02-21 PROCEDURE — 74011250637 HC RX REV CODE- 250/637: Performed by: NURSE PRACTITIONER

## 2021-02-21 PROCEDURE — 74011000250 HC RX REV CODE- 250: Performed by: NURSE PRACTITIONER

## 2021-02-21 PROCEDURE — 74011250637 HC RX REV CODE- 250/637: Performed by: PSYCHIATRY & NEUROLOGY

## 2021-02-21 RX ORDER — DIVALPROEX SODIUM 500 MG/1
500 TABLET, DELAYED RELEASE ORAL 2 TIMES DAILY
Qty: 60 TAB | Refills: 1 | Status: SHIPPED | OUTPATIENT
Start: 2021-02-21 | End: 2021-03-23

## 2021-02-21 RX ORDER — CEPHALEXIN 250 MG/1
500 CAPSULE ORAL EVERY 6 HOURS
Status: DISCONTINUED | OUTPATIENT
Start: 2021-02-21 | End: 2021-02-23 | Stop reason: HOSPADM

## 2021-02-21 RX ORDER — TRAZODONE HYDROCHLORIDE 50 MG/1
50 TABLET ORAL
Qty: 15 TAB | Refills: 1 | Status: SHIPPED | OUTPATIENT
Start: 2021-02-21 | End: 2021-03-08

## 2021-02-21 RX ORDER — MUPIROCIN 20 MG/G
OINTMENT TOPICAL 2 TIMES DAILY
Status: DISCONTINUED | OUTPATIENT
Start: 2021-02-21 | End: 2021-02-23 | Stop reason: HOSPADM

## 2021-02-21 RX ORDER — HYDROXYZINE 50 MG/1
50 TABLET, FILM COATED ORAL
Qty: 30 TAB | Refills: 2 | Status: SHIPPED | OUTPATIENT
Start: 2021-02-21 | End: 2021-03-03

## 2021-02-21 RX ORDER — OLANZAPINE 15 MG/1
15 TABLET, ORALLY DISINTEGRATING ORAL 2 TIMES DAILY
Qty: 60 TAB | Refills: 1 | Status: SHIPPED | OUTPATIENT
Start: 2021-02-21 | End: 2021-03-23

## 2021-02-21 RX ORDER — BACITRACIN 500 UNIT/G
PACKET (EA) TOPICAL
Status: COMPLETED
Start: 2021-02-21 | End: 2021-02-21

## 2021-02-21 RX ORDER — HALOPERIDOL 5 MG/1
5 TABLET ORAL 2 TIMES DAILY
Qty: 60 TAB | Refills: 1 | Status: SHIPPED | OUTPATIENT
Start: 2021-02-21 | End: 2021-03-23

## 2021-02-21 RX ADMIN — OLANZAPINE 15 MG: 15 TABLET, ORALLY DISINTEGRATING ORAL at 08:37

## 2021-02-21 RX ADMIN — HALOPERIDOL 5 MG: 5 TABLET ORAL at 08:36

## 2021-02-21 RX ADMIN — CEPHALEXIN 500 MG: 250 CAPSULE ORAL at 17:58

## 2021-02-21 RX ADMIN — BACITRACIN ZINC, POLYMYXIN B SULFATE, NEOMYCIN SULFATE 1 PACKET: 400; 5000; 3.5 OINTMENT TOPICAL at 08:36

## 2021-02-21 RX ADMIN — DIVALPROEX SODIUM 500 MG: 500 TABLET, DELAYED RELEASE ORAL at 21:13

## 2021-02-21 RX ADMIN — TRAZODONE HYDROCHLORIDE 50 MG: 50 TABLET ORAL at 21:13

## 2021-02-21 RX ADMIN — OLANZAPINE 15 MG: 15 TABLET, ORALLY DISINTEGRATING ORAL at 21:15

## 2021-02-21 RX ADMIN — DIVALPROEX SODIUM 500 MG: 500 TABLET, DELAYED RELEASE ORAL at 08:36

## 2021-02-21 RX ADMIN — CEPHALEXIN 500 MG: 250 CAPSULE ORAL at 23:19

## 2021-02-21 RX ADMIN — HALOPERIDOL 5 MG: 5 TABLET ORAL at 21:13

## 2021-02-21 RX ADMIN — BACITRACIN 1 PACKET: 500 OINTMENT TOPICAL at 18:08

## 2021-02-21 NOTE — BH NOTES
Pt. Has been visible on the unit, ambulating in the halls. Pt. Ate meals in the dining room. Pt. Attended et participated in group meetings. Pt. Interacted well with staff et peers. Will cont. To monitor q15min. Checks for safety.

## 2021-02-21 NOTE — BH NOTES
Pt. Alert and oriented x 3. Physical Assessment was done et wnl. No c/o pain voiced. Pt. Calm et cooperative. Pt. States depression is a 0/10. Pt. States anxiety is 0/10. Pt. Denies having hallucinations. Pt. Denies SI/HI. I: Administer medications as ordered and needed, Encourage pt to attend and participate in groups, encourage pt to be up for all meals and snacks, consuming all each time, encourage pt to interact with peers in a positive manner. Q 15 minute safety checks continue. R: Pt. Is compliant with medications. does attend groups, does participate. Pt. is getting up for meals, consumes 100% of meals, snacks. Pt. does interact with peers. no safety concerns at this time. P:Will cont. To monitor q15min. Checks for safety. Administer medications as ordered et as needed. Will cont. To encourage group attendance et participation.

## 2021-02-21 NOTE — PROGRESS NOTES
Problem: Psychosis  Goal: *STG: Seeks staff when feelings of self harm or harm towards others arise  Outcome: Progressing Towards Goal  Goal: *STG/LTG: Complies with medication therapy  Outcome: Progressing Towards Goal

## 2021-02-21 NOTE — PROGRESS NOTES
Discussed case interviewed patient  Taking and tolerating medications well  Continues to have some difficulties needing redirection  But makes better eye contact  More redirectable more interactive  Able to maintain safe behaviors on the unit  No overt aggression last 24 hours pledges for safety  Medication regimen reviewed  Denies hearing voices denies suicidality    Mental status exam  Alert oriented cooperative  Speech is goal-directed spontaneous  Affect is labile  Continues to have multiple requests and short periods of time  More redirectable  Less confused    Recommendations  If he continues to improve we will consider discharge tomorrow

## 2021-02-21 NOTE — BH NOTES
Patient is out on the unit still needy and attention seeking ,denies SI/HI/AVH ,MEDICATION COMPLIANT   2144 prn TRAZODONE WAS GIVEN FOR SLEEP  Slept 9 hours

## 2021-02-22 PROCEDURE — 65220000003 HC RM SEMIPRIVATE PSYCH

## 2021-02-22 PROCEDURE — 74011250637 HC RX REV CODE- 250/637: Performed by: PSYCHIATRY & NEUROLOGY

## 2021-02-22 PROCEDURE — 74011250637 HC RX REV CODE- 250/637: Performed by: NURSE PRACTITIONER

## 2021-02-22 RX ADMIN — HALOPERIDOL 5 MG: 5 TABLET ORAL at 21:09

## 2021-02-22 RX ADMIN — MUPIROCIN: 20 OINTMENT TOPICAL at 18:24

## 2021-02-22 RX ADMIN — ACETAMINOPHEN 650 MG: 325 TABLET ORAL at 15:35

## 2021-02-22 RX ADMIN — OLANZAPINE 15 MG: 15 TABLET, ORALLY DISINTEGRATING ORAL at 08:18

## 2021-02-22 RX ADMIN — TRAZODONE HYDROCHLORIDE 50 MG: 50 TABLET ORAL at 21:09

## 2021-02-22 RX ADMIN — HALOPERIDOL 5 MG: 5 TABLET ORAL at 08:18

## 2021-02-22 RX ADMIN — CEPHALEXIN 500 MG: 250 CAPSULE ORAL at 06:37

## 2021-02-22 RX ADMIN — CEPHALEXIN 500 MG: 250 CAPSULE ORAL at 18:25

## 2021-02-22 RX ADMIN — DIVALPROEX SODIUM 500 MG: 500 TABLET, DELAYED RELEASE ORAL at 08:18

## 2021-02-22 RX ADMIN — CEPHALEXIN 500 MG: 250 CAPSULE ORAL at 11:10

## 2021-02-22 RX ADMIN — HYDROXYZINE HYDROCHLORIDE 50 MG: 25 TABLET, FILM COATED ORAL at 21:15

## 2021-02-22 RX ADMIN — DIVALPROEX SODIUM 500 MG: 500 TABLET, DELAYED RELEASE ORAL at 21:09

## 2021-02-22 RX ADMIN — OLANZAPINE 15 MG: 15 TABLET, ORALLY DISINTEGRATING ORAL at 21:09

## 2021-02-22 NOTE — PROGRESS NOTES
Problem: Psychosis  Goal: *STG: Decreased hallucinations  Outcome: Resolved/Met  Goal: *STG/LTG: Complies with medication therapy  Outcome: Progressing Towards Goal

## 2021-02-22 NOTE — BH NOTES
Pt has been visible through out the day. Pt is attending groups, and is interacting appropriately with staff and peers. Pt has spent his free time resting in bed. no s/s of distress noted. no complaints of pain. Pt mood has been calm, stable. Pt states he is looking forward to going home. Continue safety checks.

## 2021-02-22 NOTE — BH NOTES
FLY spoke with Keegan Wilkes mom, in reference to his follow-up care. He will receive care at [facility]. Beatris Zbigniew agrees that Warren Ceja would be a good fit for a group home, but will live with her while he comes back to [place]. FLY explained resources available through [employer] and reminded her to have him call as soon as he got home.

## 2021-02-22 NOTE — DISCHARGE INSTRUCTIONS
Patient Education        Bipolar Disorder: Care Instructions  Your Care Instructions     Bipolar disorder is an illness that causes extreme mood changes, from times of very high energy (manic episodes) to times of depression. But many people with bipolar disorder show only the symptoms of depression. These moods may cause problems with your work, school, family life, friendships, and how well you function. This disease is also called manic-depression. There is no cure for bipolar disorder, but it can be helped with medicines. Counseling may also help. It is important to take your medicines exactly as prescribed, even when you feel well. You may need lifelong treatment. Follow-up care is a key part of your treatment and safety. Be sure to make and go to all appointments, and call your doctor if you are having problems. It's also a good idea to know your test results and keep a list of the medicines you take. How can you care for yourself at home? · Be safe with medicines. Take your medicines exactly as prescribed. Do not stop or change a medicine without talking to your doctor first. Lexi Amaro and your doctor may need to try different combinations of medicines to find what works for you. · Take your medicines on schedule to keep your moods even. When you feel good, you may think that you do not need your medicines. But it is important to keep taking them. · Go to your counseling sessions. Call and talk with your counselor if you can't go to a session or if you don't think the sessions are helping. Do not just stop going. · Get at least 30 minutes of activity on most days of the week. Walking is a good choice. You also may want to do other things, such as running, swimming, or cycling. · Get enough sleep. Keep your room dark and quiet. Try to go to bed at the same time every night. · Eat a healthy diet. This includes whole grains, dairy, fruits, vegetables, and protein. Eat foods from each of these groups.   · Try to lower your stress. Manage your time, build a strong support system, and lead a healthy lifestyle. To lower your stress, try physical activity, slow deep breathing, or getting a massage. · Do not use alcohol, marijuana, or illegal drugs. · Learn the early signs of your mood changes. You can then take steps to help yourself feel better. · Ask for help from friends and family when you need it. You may need help with daily chores when you are depressed. When you are manic, you may need support to control your high energy levels. What should you do if someone in your family has bipolar disorder? · Learn about the disease and signs it's getting worse. · Remind your family member you love them. · Make a plan with all family members about how to take care of your loved one when symptoms are bad. · Remind yourself it will take time for changes to occur. · Try not to blame yourself for the disease. · Know your legal rights and the legal rights of your family member. Support groups or counselors can help with this information. · Take care of yourself. Keep up with your interests, such as career, hobbies, and friends. Use exercise, positive self-talk, deep breathing, and other relaxing exercises to help lower your stress. · Give yourself time to grieve. You may need to deal with emotions such as anger, fear, and frustration. · If you are having a hard time with your feelings or with your relationship with your family member, talk with a counselor. When should you call for help? Call 911 anytime you think you may need emergency care. For example, call if:    · You feel like hurting yourself or someone else.     · Someone who has bipolar disorder displays dangerous behavior, and you think the person might hurt himself or herself or someone else. Call your doctor now or seek immediate medical care if:    · You hear voices.     · Someone you know has bipolar disorder and talks about suicide.  Keep the numbers for these national suicide hotlines: 2-017-735-TALK (8-300.658.3560) and 6-166-ERECHZO (9-884.112.9602). If a suicide threat seems real, with a specific plan and a way to carry it out, stay with the person, or ask someone you trust to stay with the person, until you can get help.     · Someone you know has bipolar disorder and:  ? Starts to give away possessions. ? Is using illegal drugs or drinking alcohol heavily. ? Talks or writes about death, including writing suicide notes or talking about guns, knives, or pills. ? Talks or writes about hurting someone else. ? Starts to spend a lot of time alone. ? Acts very aggressively or suddenly appears calm. ? Talks about beliefs that are not based in reality (delusions). Watch closely for changes in your health, and be sure to contact your doctor if:    · You cannot go to your counseling sessions. Where can you learn more? Go to http://www.ibanez.com/  Enter K052 in the search box to learn more about \"Bipolar Disorder: Care Instructions. \"  Current as of: January 31, 2020               Content Version: 12.6  © 1824-2483 [employer]. Care instructions adapted under license by [employer] (which disclaims liability or warranty for this information). If you have questions about a medical condition or this instruction, always ask your healthcare professional. [place] [address] any warranty or liability for your use of this information. Patient Education        Learning About Mood Disorders  What are mood disorders? Mood disorders are medical problems that affect how you feel. They can impact your moods, thoughts, and actions. Mood disorders include:  · Depression. This causes you to feel sad or hopeless for much of the time. · Bipolar disorder. This causes extreme mood changes from manic episodes of very high energy to extreme lows of depression. · Seasonal affective disorder (SAD).  This is a type of depression that affects you during the same season each year. Most often people experience SAD during the fall and winter months when days are shorter and there is less light. What are the symptoms? Depression  You may:  · Feel sad or hopeless nearly every day. · Lose interest in or not get pleasure from most daily activities. You feel this way nearly every day. · Have low energy, changes in your appetite, or changes in how well you sleep. · Have trouble concentrating. · Think about death and suicide. Keep the numbers for these national suicide hotlines: 2-666-046-TALK (4-177.588.1306) and 4-076-ADDPLET (8-253.332.2804). If you or someone you know talks about suicide or feeling hopeless, get help right away. Bipolar disorder  Symptoms depend on your mood swings. You may:  · Feel very happy, energetic, or on edge. · Feel like you need very little sleep. · Feel overly self-confident. · Do impulsive things, such as spending a lot of money. · Feel sad or hopeless. · Have racing thoughts or trouble thinking and making decisions. · Lose interest in things you have enjoyed in the past.  · Think about death and suicide. Keep the numbers for these national suicide hotlines: 7-346-792-TALK (4-225-853-222.965.6122) and 2-907-BPWMPXR (7-672.475.6295). If you or someone you know talks about suicide or feeling hopeless, get help right away. Seasonal affective disorder (SAD)  Symptoms come and go at about the same time each year. For most people with SAD, symptoms come during the winter when there is less daylight. You may:  · Feel sad, grumpy, thomas, or anxious. · Lose interest in your usual activities. · Eat more and crave carbohydrates, such as bread and pasta. · Gain weight. · Sleep more and feel drowsy during the daytime. How are mood disorders treated? Mood disorders can be treated with medicines or counseling, or a combination of both.   Medicines for depression and SAD may include antidepressants. Medicines for bipolar disorder may include:  · Mood stabilizers. · Antipsychotics. · Benzodiazepines. Counseling may involve cognitive-behavioral therapy. It teaches you how to change the ways you think and behave. This can help you stop thinking bad thoughts about yourself and your life. Light therapy is the main treatment for SAD. This therapy uses a special kind of lamp. You let the lamp shine on you at certain times, usually in the morning. This may help your symptoms during the months when there is less sunlight. Healthy lifestyle  Healthy lifestyle changes may help you feel better. · Be active often. You might try walking or strength training. · Eat a healthy diet. Include fruits, vegetables, lean proteins, and whole grains in your diet each day. · Keep a regular sleep schedule. Try for 8 hours of sleep a night. · Find ways to manage stress, such as relaxation exercises. · Avoid alcohol and illegal drugs. Follow-up care is a key part of your treatment and safety. Be sure to make and go to all appointments, and call your doctor if you are having problems. It's also a good idea to know your test results and keep a list of the medicines you take. Where can you learn more? Go to http://www.gray.com/  Enter Z126 in the search box to learn more about \"Learning About Mood Disorders. \"  Current as of: January 31, 2020               Content Version: 12.6  © 9765-3281 [employer]. Care instructions adapted under license by [employer] (which disclaims liability or warranty for this information). If you have questions about a medical condition or this instruction, always ask your healthcare professional. [place] [address] any warranty or liability for your use of this information.

## 2021-02-22 NOTE — GROUP NOTE
[facility] GROUP DOCUMENTATION INDIVIDUAL Group Therapy Note Date: 2/22/2021 Group Start Time: 1400 Group End Time: 6302 Group Topic: Education Group - Inpatient [facility] Mariano Perez PAMELLA 
 
[facility] GROUP DOCUMENTATION GROUP Group Therapy Note Group members received psychoeducation and guidance in exploring SELF. Pscyhological/Physical safety, emotions, losses, and future was defined. Group members were given the opportunity to identify their losses, continuums of safety and emotional comfort, and their sense of future. Attendance: Attended Patient's Goal:  Attendance Interventions/techniques: Informed, Validated and Supported Follows Directions: Followed directions Interactions: Interacted appropriately Mental Status: Calm and Congruent Behavior/appearance: Attentive and Cooperative Goals Achieved: Able to engage in interactions, Able to listen to others, Able to receive feedback, Discussed discharge plans, Identified triggers and Identified relapse prevention strategies TAYLOR Smith, Supervisee in Social Work

## 2021-02-22 NOTE — BH NOTES
DISCHARGE SUMMARY    NAME:Derick Fajardo  : 2000  MRN: [id number]    The patient Osmani Dress exhibits the ability to control behavior in a less restrictive environment. Patient's level of functioning is improving. No assaultive/destructive behavior has been observed for the past 24 hours. No suicidal/homicidal threat or behavior has been observed for the past 24 hours. There is no evidence of serious medication side effects. Patient has not been in physical or protective restraints for at least the past 24 hours. If weapons involved, how are they secured? Yes    Is patient aware of and in agreement with discharge plan? Yes    Arrangements for medication:  Prescriptions given to patient, given a weeks supply or 30 day supply. Copy of discharge instructions to provider?:  Yes    Arrangements for transportation home:  Father    Keep all follow up appointments as scheduled, continue to take prescribed medications per physician instructions.   Mental health crisis number:  215 or your local mental health crisis line number at 211 in [address] Emergency WARM LINE      9-883-726-MHAV (8572)      M-F: 9am to 9pm      Sat & Sun: 5pm  9pm  National suicide prevention lines:                             0-610-CIRPLZJ (7-592-940-550-066-0066)       0-794-182-TALK (1-416-077-429-492-6892)    Crisis Text Line:  Text HOME to 510347

## 2021-02-22 NOTE — BH NOTES
Behavioral Health Transition Record to Provider    Patient Name: Katelin Munguia  YOB: 2000  Medical Record Number: [id number]  Date of Admission: 2/5/2021  Date of Discharge: 2/22/2021    Attending Provider: Saulo Lee MD  Discharging Provider: Brennen Kumar  To contact this individual call [phone] and ask the  to page. If unavailable, ask to be transferred to [facility] Provider on call. [facility] Provider will be available on call 24/7 and during holidays. Primary Care Provider: Radha Pisano MD    No Known Allergies    Reason for Admission: TDO Schizophrenia    Admission Diagnosis: Schizophrenia ([facility] [address] [F20.9]  Bipolar 1 disorder ([facility] [address] [facility] [F31.9]    * No surgery found *    Results for orders placed or performed during the hospital encounter of 02/05/21   SARS-COV-2, PCR    Specimen: Nasopharyngeal   Result Value Ref Range    Specimen source Nasopharyngeal      SARS-CoV-2 Not detected Not detected   METABOLIC PANEL, COMPREHENSIVE   Result Value Ref Range    Sodium 139 136 - 145 mmol/L    Potassium 3.7 3.5 - 5.1 mmol/L    Chloride 102 97 - 108 mmol/L    CO2 27 21 - 32 mmol/L    Anion gap 10 5 - 15 mmol/L    Glucose 97 65 - 100 mg/dL    BUN 14 6 - 20 mg/dL    Creatinine 1.06 0.70 - 1.30 mg/dL    BUN/Creatinine ratio 13 12 - 20      GFR est AA >60 >60 ml/min/1.73m2    GFR est non-AA >60 >60 ml/min/1.73m2    Calcium 9.0 8.5 - 10.1 mg/dL    Bilirubin, total 0.6 0.2 - 1.0 mg/dL    AST (SGOT) 65 (H) 15 - 37 U/L    ALT (SGPT) 53 12 - 78 U/L    Alk.  phosphatase 51 45 - 117 U/L    Protein, total 7.2 6.4 - 8.2 g/dL    Albumin 4.1 3.5 - 5.0 g/dL    Globulin 3.1 2.0 - 4.0 g/dL    A-G Ratio 1.3 1.1 - 2.2     CBC WITH AUTOMATED DIFF   Result Value Ref Range    WBC 8.0 4.4 - 11.3 K/uL    RBC 4.60 4.50 - 5.90 M/uL    HGB 14.0 13.5 - 17.5 g/dL    HCT 42.5 41 - 53 %    MCV 92.5 80 - 100 FL    MCH 30.4 (L) 31 - 34 PG    MCHC 32.9 31.0 - 36.0 g/dL    RDW 12.9 11.5 - 14.5 %    PLATELET 196 K/uL    MPV 8.7 6.5 - 11.5 FL    NRBC 0.1  WBC    ABSOLUTE NRBC 0.01 K/uL    NEUTROPHILS 73 42 - 75 %    LYMPHOCYTES 15 (L) 20.5 - 51.1 %    MONOCYTES 12 (H) 1.7 - 9.3 %    EOSINOPHILS 0 (L) 0.9 - 2.9 %    BASOPHILS 0 0.0 - 2.5 %    ABS. NEUTROPHILS 5.8 1.8 - 7.7 K/UL    ABS. LYMPHOCYTES 1.2 1.0 - 4.8 K/UL    ABS. MONOCYTES 1.0 0.2 - 2.4 K/UL    ABS. EOSINOPHILS 0.0 0.0 - 0.7 K/UL    ABS. BASOPHILS 0.0 0.0 - 0.2 K/UL   URINALYSIS W/ REFLEX CULTURE    Specimen: Urine   Result Value Ref Range    Color Yellow/Straw      Appearance Clear Clear      Specific gravity 1.015 1.003 - 1.030      pH (UA) 6.5 5.0 - 8.0      Protein Trace (A) Negative mg/dL    Glucose Negative Negative mg/dL    Ketone Negative Negative mg/dL    Bilirubin Negative Negative      Blood Negative Negative      Urobilinogen >8.0 (H) 0.2 - 1.0 EU/dL    Nitrites Negative Negative      Leukocyte Esterase Negative Negative      WBC 0-4 0 - 5 /hpf    RBC 0-5 0 - 3 /hpf    Bacteria Negative Negative /hpf    UA:UC IF INDICATED Culture not indicated by UA result Culture not indicated by UA result     DRUG SCREEN, URINE   Result Value Ref Range    AMPHETAMINES Negative Negative      BARBITURATES Negative Negative      BENZODIAZEPINES Negative Negative      COCAINE Negative Negative      ECSTASY, MDMA Negative Negative      METHADONE Negative Negative      OPIATES Negative Negative      PCP(PHENCYCLIDINE) Negative Negative      THC (TH-CANNABINOL) Positive (A) Negative      Drug screen comment        This test is a screen for drugs of abuse in a medical setting only (i.e., they are unconfirmed results and as such must not be used for non-medical purposes, e.g.,employment testing, legal testing). Due to its inherent nature, false positive (FP) and false negative (FN) results may be obtained. Therefore, if necessary for medical care, recommend confirmation of positive findings by GC/MS.    ETHYL ALCOHOL   Result Value Ref Range    ALCOHOL(ETHYL),SERUM <4 <10 mg/dL   SARS-COV-2   Result Value Ref Range    SARS-CoV-2 Nasopharyngeal     TSH 3RD GENERATION   Result Value Ref Range    TSH 1.43 0.36 - 3.74 uIU/mL   LIPID PANEL   Result Value Ref Range    LIPID PROFILE        Cholesterol, total 87 <200 mg/dL    Triglyceride 42 <150 mg/dL    HDL Cholesterol 39 mg/dL    LDL, calculated 39.6 0 - 100 mg/dL    VLDL, calculated 8.4 mg/dL    CHOL/HDL Ratio 2.2 0.0 - 5.0     HEMOGLOBIN A1C WITH EAG   Result Value Ref Range    Hemoglobin A1c 5.2 4.0 - 5.6 %    Est. average glucose 103 mg/dL       Immunizations administered during this encounter: There is no immunization history on file for this patient. Screening for Metabolic Disorders for Patients on Antipsychotic Medications  (Data obtained from the EMR)    Estimated Body Mass Index  Estimated body mass index is 21.48 kg/m² as calculated from the following:    Height as of this encounter: 5' 11\" (1.803 m). Weight as of this encounter: 69.9 kg (154 lb). Vital Signs/Blood Pressure  Visit Vitals  /64   Pulse 75   Temp 98.5 °F (36.9 °C)   Resp 16   Ht 5' 11\" (1.803 m)   Wt 69.9 kg (154 lb)   SpO2 98%   BMI 21.48 kg/m²       Blood Glucose/Hemoglobin A1c  Lab Results   Component Value Date/Time    Glucose 97 02/05/2021 07:00 PM       Lab Results   Component Value Date/Time    Hemoglobin A1c 5.2 02/08/2021 07:22 AM        Lipid Panel  Lab Results   Component Value Date/Time    Cholesterol, total 87 02/08/2021 07:22 AM    HDL Cholesterol 39 02/08/2021 07:22 AM    LDL, calculated 39.6 02/08/2021 07:22 AM    Triglyceride 42 02/08/2021 07:22 AM    CHOL/HDL Ratio 2.2 02/08/2021 07:22 AM        Discharge Diagnosis: Schizophrenia    Discharge Plan: Discharge home with family. [facility] with [facility].     Discharge Medication List and Instructions:   Current Discharge Medication List      START taking these medications    Details   divalproex DR (DEPAKOTE) 500 mg tablet Take 1 Tab by mouth two (2) times a day for 30 days. Qty: 60 Tab, Refills: 1      haloperidoL (HALDOL) 5 mg tablet Take 1 Tab by mouth two (2) times a day for 30 days. Qty: 60 Tab, Refills: 1      hydrOXYzine HCL (ATARAX) 50 mg tablet Take 1 Tab by mouth three (3) times daily as needed for Anxiety for up to 10 days. Qty: 30 Tab, Refills: 2      OLANZapine (ZyPREXA zydis) 15 mg disintegrating tablet Take 1 Tab by mouth two (2) times a day for 30 days. Qty: 60 Tab, Refills: 1      traZODone (DESYREL) 50 mg tablet Take 1 Tab by mouth nightly as needed for Sleep (For insomnia) for up to 15 days. Qty: 15 Tab, Refills: 1             Unresulted Labs (24h ago, onward)    None        To obtain results of studies pending at discharge, please contact [phone]. Follow-up Information     Follow up With Specialties Details Why [address] [place]  On 2/23/2021  [address] [place] [address], [address] [place] [address]   [phone]  Fax: [phone]    None    None (395) Patient stated that they have no PCP            Advanced Directive:   Does the patient have an appointed surrogate decision maker? No  Does the patient have a Medical Advance Directive? No  Does the patient have a Psychiatric Advance Directive? No  If the patient does not have a surrogate or Medical Advance Directive AND Psychiatric Advance Directive, the patient was offered information on these advance directives Patient declined to complete    Patient Instructions: Please continue all medications until otherwise directed by physician. Tobacco Cessation Discharge Plan:   Is the patient a smoker and needs referral for smoking cessation? Yes  Patient referred to the following for smoking cessation with an appointment? Yes     Patient was offered medication to assist with smoking cessation at discharge? No  Was education for smoking cessation added to the discharge instructions?  Yes    Alcohol/Substance Abuse Discharge Plan:   Does the patient have a history of substance/alcohol abuse and requires a referral for treatment? Yes  Patient referred to the following for substance/alcohol abuse treatment with an appointment? Yes  Patient was offered medication to assist with alcohol cessation at discharge? Yes  Was education for substance/alcohol abuse added to discharge instructions?  Yes    Patient discharged to Home; discussed with patient/caregiver

## 2021-02-22 NOTE — BH NOTES
Patient is out on the unit is calm and cooperative medication compliant and states he is ready to go home

## 2021-02-22 NOTE — PROGRESS NOTES
Problem: Patient Education: Go to Patient Education Activity  Goal: Patient/Family Education  2/22/2021 1114 by Maxwell Preciado RN  Outcome: Progressing Towards Goal  2/22/2021 1113 by Maxwell Preciado RN  Outcome: Progressing Towards Goal  2/22/2021 1112 by Maxwell Preciado RN  Outcome: Progressing Towards Goal     Problem: Psychosis  Goal: *STG: Decreased delusional thinking  2/22/2021 1113 by Maxwell Preciado RN  Outcome: Resolved/Met  2/22/2021 1112 by Maxwell Preciado RN  Outcome: Progressing Towards Goal  Goal: *STG: Seeks staff when feelings of self harm or harm towards others arise  2/22/2021 1113 by Maxwell Preciado RN  Outcome: Resolved/Met  2/22/2021 1112 by Maxwell Preciado RN  Outcome: Progressing Towards Goal  Goal: *STG: Patient will verbalize areas in need of boundary recognition and limit setting  2/22/2021 1113 by Maxwell Preciado RN  Outcome: Resolved/Met  2/22/2021 1112 by Maxwell Preciado RN  Outcome: Progressing Towards Goal  Goal: *STG: Patient will develop strategies to regulate emotions and corresponding behaviors  2/22/2021 1113 by Maxwell Preciado RN  Outcome: Resolved/Met  2/22/2021 1112 by Maxwell Preciado RN  Outcome: Progressing Towards Goal  Goal: *STG: Accept constructive criticism without injury or isolation  2/22/2021 1113 by Maxwell Preciado RN  Outcome: Resolved/Met  2/22/2021 1112 by Maxwell Preciado RN  Outcome: Progressing Towards Goal  Goal: *STG: Participates in individual and group therapy  2/22/2021 1114 by Maxwell Preciado RN  Outcome: Resolved/Met  2/22/2021 1112 by Maxwell Preciado RN  Outcome: Progressing Towards Goal  Goal: *STG: Develop safety plan for times when triggered in stressful situations  2/22/2021 1114 by Maxwell Preciado RN  Outcome: Resolved/Met  2/22/2021 1112 by Maxwell Preciado RN  Outcome: Progressing Towards Goal  Goal: *STG: Patient will verbalize issues that get in their way of progress (i.e.: anger, fear etc.)  2/22/2021 1114 by Narayan Valencia RN  Outcome: Resolved/Met  2/22/2021 1112 by Narayan Valencia RN  Outcome: Progressing Towards Goal  Goal: *STG/LTG: Complies with medication therapy  2/22/2021 1114 by Narayan Valencia RN  Outcome: Resolved/Met  2/22/2021 1112 by Narayan Valencia RN  Outcome: Progressing Towards Goal  Goal: *STG/LTG: Demonstrates improved thought patterns as evidenced by logical and coherent speech  2/22/2021 1114 by Narayan Valencia RN  Outcome: Resolved/Met  2/22/2021 1112 by Narayan Valencia RN  Outcome: Progressing Towards Goal  Goal: *STG/LTG: Demonstrates improved social functioning by responding appropriately to staff  2/22/2021 1114 by Narayan Valencia RN  Outcome: Resolved/Met  2/22/2021 1112 by Narayan Valencia RN  Outcome: Progressing Towards Goal  Goal: Interventions  2/22/2021 1114 by Narayan Valencia RN  Outcome: Resolved/Met  2/22/2021 1112 by Narayan Valencia RN  Outcome: Progressing Towards Goal     Problem: Discharge Planning  Goal: *Discharge to safe environment  2/22/2021 1114 by Narayan Valencia RN  Outcome: Progressing Towards Goal  2/22/2021 1112 by Narayan Valencia RN  Outcome: Progressing Towards Goal  Goal: *Knowledge of medication management  2/22/2021 1114 by Narayan Valencia RN  Outcome: Resolved/Met  2/22/2021 1112 by Narayan Valencia RN  Outcome: Progressing Towards Goal  Goal: *Knowledge of discharge instructions  2/22/2021 1114 by Narayan Valencia RN  Outcome: Progressing Towards Goal  2/22/2021 1112 by Narayan Valencia RN  Outcome: Progressing Towards Goal     Problem: Patient Education: Go to Patient Education Activity  Goal: Patient/Family Education  2/22/2021 1114 by Narayan Valencia RN  Outcome: Resolved/Met  2/22/2021 1112 by Narayan Valencia RN  Outcome: Progressing Towards Goal

## 2021-02-22 NOTE — BH NOTES
B: Pt is alert and oriented x3. Pt is cooperative with assessments. Pt reports feeling \"good\". Pt states they are here because \"paranoia, PCP laced weed\". Pt identifies they're doing better since their admission. Pt did not identify personal coping alternatives at this time. No s/s of distress noted. No complaints of pain. I: Assess Pt mood. Document presence of depressive/anxiety symptoms. Assess for Audio/visual hallucinations. Establish trust.  Educate Pt on medications and disease processes. Monitor ADLs, food/fluid consumption and sleep. Encouarge group participation and socialization. Educate Pt on medications, coping alternatives, disease processes, and community resources. Safety checks. R: Pt mood is stable. Pt rates depression 0/10, identifies triggers as none. Pt rates anxiety at 0/10, identifies triggers as none. Pt denies SI. Pt denies HI. Pt denies audio/visual hallucinations, s/s are not observed by staff. Pt reports when he goes home he will Araceli Navi trust family\". Pt attributes his psychosis to PCP laced marijuana. Pt is attending groups and is interacting appropriately with staff/peers. Pt is eating all meals, and is maintaining their personal hygiene. Pt reports sleeping \"great\". Pt is compliant wiith medications. P:  Encourage group participation and socialization.

## 2021-02-23 VITALS
HEIGHT: 71 IN | WEIGHT: 154 LBS | RESPIRATION RATE: 18 BRPM | OXYGEN SATURATION: 98 % | SYSTOLIC BLOOD PRESSURE: 110 MMHG | HEART RATE: 82 BPM | TEMPERATURE: 97.9 F | DIASTOLIC BLOOD PRESSURE: 64 MMHG | BODY MASS INDEX: 21.56 KG/M2

## 2021-02-23 PROCEDURE — 74011250637 HC RX REV CODE- 250/637: Performed by: NURSE PRACTITIONER

## 2021-02-23 PROCEDURE — 74011250637 HC RX REV CODE- 250/637: Performed by: PSYCHIATRY & NEUROLOGY

## 2021-02-23 RX ADMIN — DIVALPROEX SODIUM 500 MG: 500 TABLET, DELAYED RELEASE ORAL at 09:00

## 2021-02-23 RX ADMIN — OLANZAPINE 15 MG: 15 TABLET, ORALLY DISINTEGRATING ORAL at 08:00

## 2021-02-23 RX ADMIN — CEPHALEXIN 500 MG: 250 CAPSULE ORAL at 05:25

## 2021-02-23 RX ADMIN — HALOPERIDOL 5 MG: 5 TABLET ORAL at 08:00

## 2021-02-23 NOTE — PROGRESS NOTES
Pt. Alert and oriented x 4. Pt. States depression is a 0. Pt. States anxiety is 2. Pt. Denies hallucinations. Denies SI/HI. Cooperative with assessment. Stated\" I'm happy about going home in the morning\". Understands reasoning for admission and feel he is better to go home. Thinks he is capable of controlling his actions when he goes home. I: Administer medications as ordered and needed, Encourage pt to attend and participate in groups, encourage pt to be up for all meals and snacks, consuming all each time, encourage pt to interact with peers in a positive manner. Q 15 minute safety checks continue. R: Compliant with medications. does attend groups, does participate. Pt. is getting up for meals, consumes 100% of meals, snacks. Pt. does interact with peers. no safety concerns at this time. P: Will continue to comply with plan of care and medications.

## 2021-02-23 NOTE — BH NOTES
Pt is awake and alert, denies suicidal and homicidal thoughts, depression and anxiety. States he is feeling well and is ready for discharge. Discharge instructions given and pt states he understands all instructions. Safe on unit. Continue to monitor pt, provide safe environment. Pt states he is ready for discharge and father is on the way to pick him up. Compliant with meds, safe on unit. Continue to monitor, give meds as ordered.

## 2021-03-09 NOTE — DISCHARGE SUMMARY
Discharge summary    Hospital course  Derick is a 20-year-old male admitted to psychiatric unit February 5th and discharged February 23.  During his hospital stay he was mostly under my care.  He presented with acute psychotic symptoms paranoid ideations unstable mood.  He was behaving in an unsafe manner walking without a shirt on [place]  During his hospitalization we worked on his thought process psychotic symptoms and his mood.  He was gradually more able to participate in his treatment we started him on medications and adjusted the doses and the kind as hospitalization progressed.  As far as antipsychotic we have started him on olanzapine 15 mg 2 times a day, and he was also started on Depakote 500 mg 2 times a day for mood instability.  He has had some benefit from these medications but he continued to have acute psychotic symptoms so we have also added Haldol 5 mg 2 times a day to his medication regimen and.  He benefited from these medications and his psychotic symptoms decreased.  He as hospitalization continued he was able to present himself better have more reasonable conversations less confused less paranoid less preoccupied.  His psychotic symptoms decreased and his moods were more stable and he was able to interact with others with more reasonability.  He is also communicated with home and work on his safety planning at discharge.  On December 23 we decided that the goals of acute hospital stay was met and he was discharged that day.  He pledged for safety outside the hospital denied suicidal ideations.  He was tolerating his medications well eating and sleeping well and maintaining safe behaviors before discharge.    Discharge mental status exam  Alert oriented cooperative  Speech is goal-directed spontaneous  Mood is good affect is improved  Thought process linear and logical  No suicidal ideations  No evidence of acute psychosis  No hearing voices does not appear to be  responding to internal stimuli  Insight and judgment improved    Diagnosis  Bipolar disorder manic episode with psychotic features    Recommendations  Continue current medications including Zyprexa 15 mg 2 times a day, Haldol 5 mg 2 times a day and Depakote 500 mg 2 times a day  Discharge follow-up appointments and referrals were made  Pledges for safety outside the hospital    Discharge condition  Improved, we wish him well
